# Patient Record
Sex: MALE | Race: WHITE | NOT HISPANIC OR LATINO | Employment: FULL TIME | URBAN - METROPOLITAN AREA
[De-identification: names, ages, dates, MRNs, and addresses within clinical notes are randomized per-mention and may not be internally consistent; named-entity substitution may affect disease eponyms.]

---

## 2017-01-10 ENCOUNTER — OFFICE VISIT - HEALTHEAST (OUTPATIENT)
Dept: INTERNAL MEDICINE | Facility: CLINIC | Age: 32
End: 2017-01-10

## 2017-01-10 DIAGNOSIS — J01.90 ACUTE SINUSITIS, RECURRENCE NOT SPECIFIED, UNSPECIFIED LOCATION: ICD-10-CM

## 2017-01-10 ASSESSMENT — MIFFLIN-ST. JEOR: SCORE: 1955.05

## 2017-02-09 ENCOUNTER — COMMUNICATION - HEALTHEAST (OUTPATIENT)
Dept: FAMILY MEDICINE | Facility: CLINIC | Age: 32
End: 2017-02-09

## 2017-02-09 DIAGNOSIS — E10.9 TYPE 1 DIABETES MELLITUS WITHOUT COMPLICATION (H): ICD-10-CM

## 2017-05-23 ENCOUNTER — COMMUNICATION - HEALTHEAST (OUTPATIENT)
Dept: FAMILY MEDICINE | Facility: CLINIC | Age: 32
End: 2017-05-23

## 2017-05-23 DIAGNOSIS — E10.9 TYPE 1 DIABETES MELLITUS WITHOUT COMPLICATION (H): ICD-10-CM

## 2017-07-28 ENCOUNTER — COMMUNICATION - HEALTHEAST (OUTPATIENT)
Dept: INTERNAL MEDICINE | Facility: CLINIC | Age: 32
End: 2017-07-28

## 2017-07-28 ENCOUNTER — OFFICE VISIT - HEALTHEAST (OUTPATIENT)
Dept: INTERNAL MEDICINE | Facility: CLINIC | Age: 32
End: 2017-07-28

## 2017-07-28 DIAGNOSIS — Z00.00 HEALTH MAINTENANCE EXAMINATION: ICD-10-CM

## 2017-07-28 DIAGNOSIS — E10.9 TYPE 1 DIABETES MELLITUS WITHOUT COMPLICATION (H): ICD-10-CM

## 2017-07-28 DIAGNOSIS — B35.3 TINEA PEDIS OF RIGHT FOOT: ICD-10-CM

## 2017-07-28 LAB
CHOLEST SERPL-MCNC: 173 MG/DL
FASTING STATUS PATIENT QL REPORTED: YES
HBA1C MFR BLD: 7.2 % (ref 3.5–6)
HDLC SERPL-MCNC: 41 MG/DL
LDLC SERPL CALC-MCNC: 118 MG/DL
TRIGL SERPL-MCNC: 70 MG/DL

## 2017-07-28 ASSESSMENT — MIFFLIN-ST. JEOR: SCORE: 2005.62

## 2017-11-27 ENCOUNTER — COMMUNICATION - HEALTHEAST (OUTPATIENT)
Dept: INTERNAL MEDICINE | Facility: CLINIC | Age: 32
End: 2017-11-27

## 2017-11-27 DIAGNOSIS — L97.509 DIABETIC FOOT ULCER (H): ICD-10-CM

## 2017-11-27 DIAGNOSIS — E11.621 DIABETIC FOOT ULCER (H): ICD-10-CM

## 2018-01-09 ENCOUNTER — COMMUNICATION - HEALTHEAST (OUTPATIENT)
Dept: FAMILY MEDICINE | Facility: CLINIC | Age: 33
End: 2018-01-09

## 2018-01-09 DIAGNOSIS — E10.9 TYPE 1 DIABETES MELLITUS WITHOUT COMPLICATION (H): ICD-10-CM

## 2018-01-11 ENCOUNTER — COMMUNICATION - HEALTHEAST (OUTPATIENT)
Dept: INTERNAL MEDICINE | Facility: CLINIC | Age: 33
End: 2018-01-11

## 2018-02-06 ENCOUNTER — OFFICE VISIT - HEALTHEAST (OUTPATIENT)
Dept: INTERNAL MEDICINE | Facility: CLINIC | Age: 33
End: 2018-02-06

## 2018-02-06 DIAGNOSIS — E10.9 TYPE 1 DIABETES MELLITUS WITHOUT COMPLICATION (H): ICD-10-CM

## 2018-02-06 DIAGNOSIS — R21 RASH: ICD-10-CM

## 2018-02-06 DIAGNOSIS — D17.9 LIPOMA: ICD-10-CM

## 2018-02-06 LAB
ANION GAP SERPL CALCULATED.3IONS-SCNC: 13 MMOL/L (ref 5–18)
BUN SERPL-MCNC: 11 MG/DL (ref 8–22)
CALCIUM SERPL-MCNC: 10 MG/DL (ref 8.5–10.5)
CHLORIDE BLD-SCNC: 101 MMOL/L (ref 98–107)
CHOLEST SERPL-MCNC: 192 MG/DL
CO2 SERPL-SCNC: 28 MMOL/L (ref 22–31)
CREAT SERPL-MCNC: 0.94 MG/DL (ref 0.7–1.3)
CREAT UR-MCNC: 360.3 MG/DL
FASTING STATUS PATIENT QL REPORTED: YES
GFR SERPL CREATININE-BSD FRML MDRD: >60 ML/MIN/1.73M2
GLUCOSE BLD-MCNC: 92 MG/DL (ref 70–125)
HBA1C MFR BLD: 6.8 % (ref 3.5–6)
HDLC SERPL-MCNC: 48 MG/DL
LDLC SERPL CALC-MCNC: 126 MG/DL
MICROALBUMIN UR-MCNC: 0.93 MG/DL (ref 0–1.99)
MICROALBUMIN/CREAT UR: 2.6 MG/G
POTASSIUM BLD-SCNC: 4.3 MMOL/L (ref 3.5–5)
SODIUM SERPL-SCNC: 142 MMOL/L (ref 136–145)
TRIGL SERPL-MCNC: 91 MG/DL

## 2018-02-06 ASSESSMENT — MIFFLIN-ST. JEOR: SCORE: 1989.97

## 2018-02-19 ENCOUNTER — OFFICE VISIT - HEALTHEAST (OUTPATIENT)
Dept: SURGERY | Facility: CLINIC | Age: 33
End: 2018-02-19

## 2018-02-19 DIAGNOSIS — R22.9 LUMP OF SKIN: ICD-10-CM

## 2018-02-19 ASSESSMENT — MIFFLIN-ST. JEOR: SCORE: 1988.04

## 2018-03-19 ENCOUNTER — ANESTHESIA - HEALTHEAST (OUTPATIENT)
Dept: SURGERY | Facility: AMBULATORY SURGERY CENTER | Age: 33
End: 2018-03-19

## 2018-03-19 ASSESSMENT — MIFFLIN-ST. JEOR: SCORE: 1981.02

## 2018-03-20 ENCOUNTER — SURGERY - HEALTHEAST (OUTPATIENT)
Dept: SURGERY | Facility: AMBULATORY SURGERY CENTER | Age: 33
End: 2018-03-20

## 2018-06-27 ENCOUNTER — OFFICE VISIT (OUTPATIENT)
Dept: ORTHOPEDICS | Facility: CLINIC | Age: 33
End: 2018-06-27
Payer: COMMERCIAL

## 2018-06-27 VITALS — HEIGHT: 71 IN | DIASTOLIC BLOOD PRESSURE: 69 MMHG | HEART RATE: 68 BPM | SYSTOLIC BLOOD PRESSURE: 118 MMHG

## 2018-06-27 DIAGNOSIS — S86.002A INJURY OF LEFT ACHILLES TENDON, INITIAL ENCOUNTER: Primary | ICD-10-CM

## 2018-06-27 NOTE — MR AVS SNAPSHOT
After Visit Summary   6/27/2018    Faraz Horowitz    MRN: [id number]           Patient Information     Date Of Birth          1985        Visit Information        Provider Department      6/27/2018 9:30 AM Al Gutierrez MD [facility]        Today's Diagnoses     Injury of left Achilles tendon, initial encounter    -  1       Follow-ups after your visit        Your next 10 appointments already scheduled     Jul 03, 2018  6:15 AM CDT   MR ANKLE LEFT W/O CONTRAST with [id number]   [facility] ([facility])    [address] [address] [phone]           Take your medicines as usual, unless your doctor tells you not to. Bring a list of your current medicines to your exam (including vitamins, minerals and over-the-counter drugs). Also bring the results of similar scans you may have had.  Please remove any body piercings and hair extensions before you arrive.  Follow your doctor s orders. If you do not, we may have to postpone your exam.  You may or may not receive IV contrast for this exam pending the discretion of the Radiologist.  You do not need to do anything special to prepare.  The MRI machine uses a strong magnet. Please wear clothes without metal (snaps, zippers). A sweatsuit works well, or we may give you a hospital gown.   **IMPORTANT** THE INSTRUCTIONS BELOW ARE ONLY FOR THOSE PATIENTS WHO HAVE BEEN PRESCRIBED SEDATION OR GENERAL ANESTHESIA DURING THEIR MRI PROCEDURE:  IF YOUR DOCTOR PRESCRIBED ORAL SEDATION (take medicine to help you relax during your exam):   You must get the medicine from your doctor (oral medication) before you arrive. Bring the medicine to the exam. Do not take it at home. You ll be told when to take it upon arriving for your exam.   Arrive one hour early. Bring someone who can take you home after the test. Your medicine will make you sleepy.  After the exam, you may not drive, take a bus or take a taxi by yourself.  IF YOUR DOCTOR PRESCRIBED IV SEDATION:   Arrive one hour early. Bring someone who can take you home after the test. Your medicine will make you sleepy. After the exam, you may not drive, take a bus or take a taxi by yourself.   No eating 6 hours before your exam. You may have clear liquids up until 4 hours before your exam. (Clear liquids include water, clear tea, black coffee and fruit juice without pulp.)  IF YOUR DOCTOR PRESCRIBED ANESTHESIA (be asleep for your exam):   Arrive 1 1/2 hours early. Bring someone who can take you home after the test. You may not drive, take a bus or take a taxi by yourself.   No eating 8 hours before your exam. You may have clear liquids up until 4 hours before your exam. (Clear liquids include water, clear tea, black coffee and fruit juice without pulp.)   You will spend four to five hours in the recovery room.  Please call the Imaging Department at your exam site with any questions.              Future tests that were ordered for you today     Open Future Orders        Priority Expected Expires Ordered    MR Ankle Left w/o Contrast Routine  6/27/2019 6/27/2018            Who to contact     Please call your clinic at [phone] to:    Ask questions about your health    Make or cancel appointments    Discuss your medicines    Learn about your test results    Speak to your doctor            Additional Information About Your Visit        Skyl[employer] Information     [employer] is an electronic gateway that provides easy, online access to your medical records. With DAQRI, you can request a clinic appointment, read your test results, renew a prescription or communicate with your care team.     To sign up for DAQRI visit the website at [email or web address]   You will be asked to enter the access code listed below, as well as some personal information. Please follow the directions to create your username and  "password.     Your access code is: [email or web address]  Expires: 2018 11:01 AM     Your access code will  in 90 days. If you need help or a new code, please contact your [facility] or call [phone] for assistance.        Care EveryWhere ID     This is your Care EveryWhere ID. This could be used by other organizations to access your [facility] medical records  [id number]        Your Vitals Were     Pulse Height                68 5' 10.5\" (1.791 m)           Blood Pressure from Last 3 Encounters:   18 118/69   08 124/62   08 112/72    Weight from Last 3 Encounters:   08 195 lb (88.5 kg)               Primary Care Provider    None Specified       No primary provider on file.        Equal Access to Services     [facility] : Roberto zamudio Soteagan, waaxda luqadaha, qaybta kaalmada isabelle, huber de la torre . So [facility] [phone].    ATENCIÓN: Si habla español, tiene a chavez disposición servicios gratuitos de asistencia lingüística. Wai al [phone].    We comply with applicable federal civil rights laws and [place] laws. We do not discriminate on the basis of race, color, national origin, age, disability, sex, sexual orientation, or gender identity.            Thank you!     Thank you for choosing [facility]  for your care. Our goal is always to provide you with excellent care. Hearing back from our patients is one way we can continue to improve our services. Please take a few minutes to complete the written survey that you may receive in the mail after your visit with us. Thank you!             Your Updated Medication List - Protect others around you: Learn how to safely use, store and throw away your medicines at www.disposemymeds.org.          This list is accurate as of 18 11:01 AM.  Always use your most recent med list.                   Brand Name Dispense Instructions for " use Diagnosis    LANTUS SC      None Entered

## 2018-06-27 NOTE — PROGRESS NOTES
SPORTS & ORTHOPEDIC WALK-IN VISIT 6/27/2018    Primary Care Physician:  11 days ago Inversion SUZANNE from pot hole. Some med/lat tenderness. Pain/Bruising along posterior ankle/achilles tendon  Reason for visit:     What part of your body is injured / painful?  left ankle    What caused the injury /pain? Stepping in a pot hole    How long ago did your injury occur or pain begin? a week ago    What are your most bothersome symptoms? Pain    How would you characterize your symptom?  aching    What makes your symptoms better? Rest, Ice and Ibuprofen    What makes your symptoms worse? Walking    Have you been previously seen for this problem? No    Medical History:    Any recent changes to your medical history? No    Any new medication prescribed since last visit? No    Have you had surgery on this body part before? No    Social History:    Occupation: [employer]    Handedness: Right    Exercise: Cardiovascular    Review of Systems:    Do you have fever, chills, weight loss? No    Do you have any vision problems? No    Do you have any chest pain or edema? No    Do you have any shortness of breath or wheezing?  No    Do you have stomach problems? No    Do you have any numbness or focal weakness? No    Do you have diabetes? Yes, Type 1    Do you have problems with bleeding or clotting? No    Do you have an rashes or other skin lesions? No

## 2018-06-27 NOTE — LETTER
6/27/2018       RE: Faraz Horowitz  [address] [address]     Dear Colleague,    Thank you for referring your patient, Faraz Horowitz, to the [facility]IN[facility] at [facility]. Please see a copy of my visit note below.          SPORTS & ORTHOPEDIC WALK-IN VISIT 6/27/2018    Primary Care Physician:  11 days ago Inversion SUZANNE from pot hole. Some med/lat tenderness. Pain/Bruising along posterior ankle/achilles tendon  Reason for visit:     What part of your body is injured / painful?  left ankle    What caused the injury /pain? Stepping in a pot hole    How long ago did your injury occur or pain begin? a week ago    What are your most bothersome symptoms? Pain    How would you characterize your symptom?  aching    What makes your symptoms better? Rest, Ice and Ibuprofen    What makes your symptoms worse? Walking    Have you been previously seen for this problem? No    Medical History:    Any recent changes to your medical history? No    Any new medication prescribed since last visit? No    Have you had surgery on this body part before? No    Social History:    Occupation: [employer]    Handedness: Right    Exercise: Cardiovascular    Review of Systems:    Do you have fever, chills, weight loss? No    Do you have any vision problems? No    Do you have any chest pain or edema? No    Do you have any shortness of breath or wheezing?  No    Do you have stomach problems? No    Do you have any numbness or focal weakness? No    Do you have diabetes? Yes, Type 1    Do you have problems with bleeding or clotting? No    Do you have an rashes or other skin lesions? No           [facility] Note      Patient is a 32 year old male who presents to the office today for: left ankle injury.  11 days was walking backwards and stepped in pothole suffering a ankle injury.  He suspects inversion type mechanism.  Had  "pain in the medial ankle immediately after the injury however in the days since has had increasing pain in the posterior calf and heel.  Has had some swelling and ecchymosis in the ankle and heel.  No prior history of ankle injury or heel pain.  Has pain with walking.  No pain at rest.  No tingling or numbness.    Denies weakness, numbness, tingling, clicking, locking, or catching.      Past Medical History, Current Medications, and Allergies are reviewed in the electronic medical record as appropriate.     ROS: Pertinent items are noted in HPI.  Constitutional: negative for fevers, chills and malaise  Cardiovascular: negative for dyspnea, fatigue,   Integument/breast: negative for rash, skin lesion(s)   Neurological: negative for paresthesia and weakness      EXAM:Ht 5' 10.5\" (1.791 m)    General: Alert, pleasant, no distress  Left ankle: Moderate soft tissue swelling throughout the ankle with ecchymosis inferior to the medial and lateral malleoli.  No deformity.  Walks with antalgic gait.  Pain with passive dorsiflexion and plantarflexion against resistance.  Plantarflexion 4/5 compared to contralateral side.  Otherwise no pain with range of motion or strength testing.  There is TTP of the medial lateral malleoli as well as the midportion of the Achilles tendon extending proximally.  There is less Achilles bulk to palpation in comparison to contralateral side.     Imaging: MRI pending       Assessment: Patient is a 32 year old male with left ankle injury for the last 10 days.  Given the localization of the current symptoms as well as exam findings have concern for substantial though not complete Achilles tendon rupture.    Recommendations:   Reviewed assessment with the patient in detail.  Recommended walking boot with heel lift for comfort and support.  Follow-up for MRI.  Will discuss next steps after results available.  Okay to continue elevating and icing for swelling.  NSAIDs or acetaminophen for " pain.    Al Gutierrez MD

## 2018-06-27 NOTE — PROGRESS NOTES
"[facility] Note      Patient is a 32 year old male who presents to the office today for: left ankle injury.  11 days was walking backwards and stepped in pothole suffering a ankle injury.  He suspects inversion type mechanism.  Had pain in the medial ankle immediately after the injury however in the days since has had increasing pain in the posterior calf and heel.  Has had some swelling and ecchymosis in the ankle and heel.  No prior history of ankle injury or heel pain.  Has pain with walking.  No pain at rest.  No tingling or numbness.    Denies weakness, numbness, tingling, clicking, locking, or catching.      Past Medical History, Current Medications, and Allergies are reviewed in the electronic medical record as appropriate.     ROS: Pertinent items are noted in HPI.  Constitutional: negative for fevers, chills and malaise  Cardiovascular: negative for dyspnea, fatigue,   Integument/breast: negative for rash, skin lesion(s)   Neurological: negative for paresthesia and weakness      EXAM:Ht 5' 10.5\" (1.791 m)    General: Alert, pleasant, no distress  Left ankle: Moderate soft tissue swelling throughout the ankle with ecchymosis inferior to the medial and lateral malleoli.  No deformity.  Walks with antalgic gait.  Pain with passive dorsiflexion and plantarflexion against resistance.  Plantarflexion 4/5 compared to contralateral side.  Otherwise no pain with range of motion or strength testing.  There is TTP of the medial lateral malleoli as well as the midportion of the Achilles tendon extending proximally.  There is less Achilles bulk to palpation in comparison to contralateral side.     Imaging: MRI pending       Assessment: Patient is a 32 year old male with left ankle injury for the last 10 days.  Given the localization of the current symptoms as well as exam findings have concern for substantial though not complete Achilles tendon rupture.    Recommendations:   Reviewed " assessment with the patient in detail.  Recommended walking boot with heel lift for comfort and support.  Follow-up for MRI.  Will discuss next steps after results available.  Okay to continue elevating and icing for swelling.  NSAIDs or acetaminophen for pain.    Al Gutierrez MD

## 2018-07-03 ENCOUNTER — RADIANT APPOINTMENT (OUTPATIENT)
Dept: MRI IMAGING | Facility: CLINIC | Age: 33
End: 2018-07-03
Attending: FAMILY MEDICINE
Payer: COMMERCIAL

## 2018-07-03 DIAGNOSIS — S86.002A INJURY OF LEFT ACHILLES TENDON, INITIAL ENCOUNTER: ICD-10-CM

## 2018-07-05 ENCOUNTER — TELEPHONE (OUTPATIENT)
Dept: ORTHOPEDICS | Facility: CLINIC | Age: 33
End: 2018-07-05

## 2018-07-05 NOTE — TELEPHONE ENCOUNTER
Patient called requesting results from MRI done 7/3. He is going out of town next Monday and would like to have time to  anything he needs before he leaves. I told him that Dr. Gutierrez is not in clinic today, but he will be back tomorrow. Sometimes he responds to messages from home but I can't guarantee it. Dr. Gutierrez should be able to get back to him by tomorrow though. He verbalized understanding. Message was routed to Dr. Gutierrez.

## 2018-07-06 ENCOUNTER — TELEPHONE (OUTPATIENT)
Dept: ORTHOPEDICS | Facility: CLINIC | Age: 33
End: 2018-07-06

## 2018-07-06 NOTE — TELEPHONE ENCOUNTER
[facility] Call Center    Phone Message    May a detailed message be left on voicemail: yes    Reason for Call: Other: Pt would like to speak to Dr. Gutierrez about what he should do with the ankle injury bc he's going out of town.      Action Taken: Message routed to:  [facility] ([facility]): Karyn

## 2018-07-10 NOTE — TELEPHONE ENCOUNTER
Discussed results with patient over the phone on 7/6. Recommended follow up with Dr. Barnes of foot and ankle surgery. Contact info given. Recommended continue in boot with heel lift and crutches until follow up.

## 2018-12-08 ENCOUNTER — COMMUNICATION - HEALTHEAST (OUTPATIENT)
Dept: FAMILY MEDICINE | Facility: CLINIC | Age: 33
End: 2018-12-08

## 2018-12-08 DIAGNOSIS — E10.9 TYPE 1 DIABETES MELLITUS WITHOUT COMPLICATION (H): ICD-10-CM

## 2018-12-11 ENCOUNTER — OFFICE VISIT - HEALTHEAST (OUTPATIENT)
Dept: INTERNAL MEDICINE | Facility: CLINIC | Age: 33
End: 2018-12-11

## 2018-12-11 DIAGNOSIS — E10.9 TYPE 1 DIABETES MELLITUS WITHOUT COMPLICATION (H): ICD-10-CM

## 2018-12-11 DIAGNOSIS — Z00.00 HEALTH MAINTENANCE EXAMINATION: ICD-10-CM

## 2018-12-11 LAB
ALBUMIN SERPL-MCNC: 4.2 G/DL (ref 3.5–5)
ALP SERPL-CCNC: 58 U/L (ref 45–120)
ALT SERPL W P-5'-P-CCNC: 19 U/L (ref 0–45)
ANION GAP SERPL CALCULATED.3IONS-SCNC: 11 MMOL/L (ref 5–18)
AST SERPL W P-5'-P-CCNC: 18 U/L (ref 0–40)
BILIRUB DIRECT SERPL-MCNC: 0.3 MG/DL
BILIRUB SERPL-MCNC: 1.1 MG/DL (ref 0–1)
BUN SERPL-MCNC: 12 MG/DL (ref 8–22)
CALCIUM SERPL-MCNC: 9.7 MG/DL (ref 8.5–10.5)
CHLORIDE BLD-SCNC: 103 MMOL/L (ref 98–107)
CHOLEST SERPL-MCNC: 174 MG/DL
CO2 SERPL-SCNC: 28 MMOL/L (ref 22–31)
CREAT SERPL-MCNC: 0.93 MG/DL (ref 0.7–1.3)
CREAT UR-MCNC: 183.8 MG/DL
FASTING STATUS PATIENT QL REPORTED: YES
GFR SERPL CREATININE-BSD FRML MDRD: >60 ML/MIN/1.73M2
GLUCOSE BLD-MCNC: 75 MG/DL (ref 70–125)
HBA1C MFR BLD: 6.5 % (ref 3.5–6)
HDLC SERPL-MCNC: 50 MG/DL
LDLC SERPL CALC-MCNC: 109 MG/DL
MICROALBUMIN UR-MCNC: <0.5 MG/DL (ref 0–1.99)
MICROALBUMIN/CREAT UR: NORMAL MG/G
POTASSIUM BLD-SCNC: 4 MMOL/L (ref 3.5–5)
PROT SERPL-MCNC: 7 G/DL (ref 6–8)
SODIUM SERPL-SCNC: 142 MMOL/L (ref 136–145)
TRIGL SERPL-MCNC: 75 MG/DL

## 2018-12-11 ASSESSMENT — MIFFLIN-ST. JEOR: SCORE: 1992.41

## 2019-01-10 ENCOUNTER — COMMUNICATION - HEALTHEAST (OUTPATIENT)
Dept: INTERNAL MEDICINE | Facility: CLINIC | Age: 34
End: 2019-01-10

## 2019-01-10 DIAGNOSIS — E10.9 TYPE 1 DIABETES MELLITUS WITHOUT COMPLICATION (H): ICD-10-CM

## 2019-01-14 ENCOUNTER — COMMUNICATION - HEALTHEAST (OUTPATIENT)
Dept: INTERNAL MEDICINE | Facility: CLINIC | Age: 34
End: 2019-01-14

## 2019-01-14 DIAGNOSIS — E10.9 TYPE 1 DIABETES MELLITUS WITHOUT COMPLICATION (H): ICD-10-CM

## 2019-01-15 ENCOUNTER — COMMUNICATION - HEALTHEAST (OUTPATIENT)
Dept: INTERNAL MEDICINE | Facility: CLINIC | Age: 34
End: 2019-01-15

## 2019-04-04 ENCOUNTER — COMMUNICATION - HEALTHEAST (OUTPATIENT)
Dept: INTERNAL MEDICINE | Facility: CLINIC | Age: 34
End: 2019-04-04

## 2019-04-04 DIAGNOSIS — E10.9 TYPE 1 DIABETES MELLITUS WITHOUT COMPLICATION (H): ICD-10-CM

## 2019-04-19 ENCOUNTER — COMMUNICATION - HEALTHEAST (OUTPATIENT)
Dept: INTERNAL MEDICINE | Facility: CLINIC | Age: 34
End: 2019-04-19

## 2019-04-19 DIAGNOSIS — E10.9 TYPE 1 DIABETES MELLITUS WITHOUT COMPLICATION (H): ICD-10-CM

## 2019-07-02 ENCOUNTER — COMMUNICATION - HEALTHEAST (OUTPATIENT)
Dept: INTERNAL MEDICINE | Facility: CLINIC | Age: 34
End: 2019-07-02

## 2019-08-29 ENCOUNTER — COMMUNICATION - HEALTHEAST (OUTPATIENT)
Dept: INTERNAL MEDICINE | Facility: CLINIC | Age: 34
End: 2019-08-29

## 2019-08-29 DIAGNOSIS — E10.9 TYPE 1 DIABETES MELLITUS WITHOUT COMPLICATION (H): ICD-10-CM

## 2019-09-12 ENCOUNTER — OFFICE VISIT - HEALTHEAST (OUTPATIENT)
Dept: INTERNAL MEDICINE | Facility: CLINIC | Age: 34
End: 2019-09-12

## 2019-09-12 DIAGNOSIS — E10.9 TYPE 1 DIABETES MELLITUS (H): ICD-10-CM

## 2019-09-12 DIAGNOSIS — B35.3 TINEA PEDIS OF BOTH FEET: ICD-10-CM

## 2019-09-12 LAB — HBA1C MFR BLD: 6.7 % (ref 3.5–6)

## 2019-10-02 ENCOUNTER — HEALTH MAINTENANCE LETTER (OUTPATIENT)
Age: 34
End: 2019-10-02

## 2019-10-14 ENCOUNTER — RECORDS - HEALTHEAST (OUTPATIENT)
Dept: ADMINISTRATIVE | Facility: OTHER | Age: 34
End: 2019-10-14

## 2019-10-23 ENCOUNTER — RECORDS - HEALTHEAST (OUTPATIENT)
Dept: HEALTH INFORMATION MANAGEMENT | Facility: CLINIC | Age: 34
End: 2019-10-23

## 2019-12-05 ENCOUNTER — COMMUNICATION - HEALTHEAST (OUTPATIENT)
Dept: INTERNAL MEDICINE | Facility: CLINIC | Age: 34
End: 2019-12-05

## 2019-12-05 DIAGNOSIS — B35.3 TINEA PEDIS OF BOTH FEET: ICD-10-CM

## 2019-12-16 ENCOUNTER — COMMUNICATION - HEALTHEAST (OUTPATIENT)
Dept: INTERNAL MEDICINE | Facility: CLINIC | Age: 34
End: 2019-12-16

## 2019-12-16 DIAGNOSIS — E10.9 TYPE 1 DIABETES MELLITUS (H): ICD-10-CM

## 2019-12-19 ENCOUNTER — COMMUNICATION - HEALTHEAST (OUTPATIENT)
Dept: LAB | Facility: CLINIC | Age: 34
End: 2019-12-19

## 2019-12-19 DIAGNOSIS — Z00.00 HEALTH MAINTENANCE EXAMINATION: ICD-10-CM

## 2019-12-19 DIAGNOSIS — E10.9 TYPE 1 DIABETES MELLITUS WITHOUT COMPLICATION (H): ICD-10-CM

## 2019-12-19 DIAGNOSIS — E55.9 VITAMIN D DEFICIENCY DISEASE: ICD-10-CM

## 2020-01-30 ENCOUNTER — AMBULATORY - HEALTHEAST (OUTPATIENT)
Dept: LAB | Facility: CLINIC | Age: 35
End: 2020-01-30

## 2020-01-30 DIAGNOSIS — E10.9 TYPE 1 DIABETES MELLITUS (H): ICD-10-CM

## 2020-01-30 DIAGNOSIS — E10.9 TYPE 1 DIABETES MELLITUS WITHOUT COMPLICATION (H): ICD-10-CM

## 2020-01-30 DIAGNOSIS — Z00.00 HEALTH MAINTENANCE EXAMINATION: ICD-10-CM

## 2020-01-30 LAB
ALBUMIN SERPL-MCNC: 4.3 G/DL (ref 3.5–5)
ALP SERPL-CCNC: 60 U/L (ref 45–120)
ALT SERPL W P-5'-P-CCNC: 15 U/L (ref 0–45)
ANION GAP SERPL CALCULATED.3IONS-SCNC: 9 MMOL/L (ref 5–18)
AST SERPL W P-5'-P-CCNC: 16 U/L (ref 0–40)
BILIRUB DIRECT SERPL-MCNC: 0.2 MG/DL
BILIRUB SERPL-MCNC: 0.6 MG/DL (ref 0–1)
BUN SERPL-MCNC: 12 MG/DL (ref 8–22)
CALCIUM SERPL-MCNC: 9.9 MG/DL (ref 8.5–10.5)
CHLORIDE BLD-SCNC: 105 MMOL/L (ref 98–107)
CHOLEST SERPL-MCNC: 174 MG/DL
CO2 SERPL-SCNC: 30 MMOL/L (ref 22–31)
CREAT SERPL-MCNC: 1.02 MG/DL (ref 0.7–1.3)
CREAT UR-MCNC: 152.5 MG/DL
ERYTHROCYTE [DISTWIDTH] IN BLOOD BY AUTOMATED COUNT: 11.9 % (ref 11–14.5)
FASTING STATUS PATIENT QL REPORTED: YES
GFR SERPL CREATININE-BSD FRML MDRD: >60 ML/MIN/1.73M2
GLUCOSE BLD-MCNC: 68 MG/DL (ref 70–125)
HBA1C MFR BLD: 7.1 % (ref 3.5–6)
HCT VFR BLD AUTO: 50.3 % (ref 40–54)
HDLC SERPL-MCNC: 51 MG/DL
HGB BLD-MCNC: 17.2 G/DL (ref 14–18)
LDLC SERPL CALC-MCNC: 105 MG/DL
MCH RBC QN AUTO: 30.8 PG (ref 27–34)
MCHC RBC AUTO-ENTMCNC: 34.2 G/DL (ref 32–36)
MCV RBC AUTO: 90 FL (ref 80–100)
MICROALBUMIN UR-MCNC: <0.5 MG/DL (ref 0–1.99)
MICROALBUMIN/CREAT UR: NORMAL MG/G{CREAT}
PLATELET # BLD AUTO: 298 THOU/UL (ref 140–440)
PMV BLD AUTO: 10.9 FL (ref 8.5–12.5)
POTASSIUM BLD-SCNC: 4.7 MMOL/L (ref 3.5–5)
PROT SERPL-MCNC: 7 G/DL (ref 6–8)
RBC # BLD AUTO: 5.59 MILL/UL (ref 4.4–6.2)
SODIUM SERPL-SCNC: 144 MMOL/L (ref 136–145)
TRIGL SERPL-MCNC: 92 MG/DL
WBC: 5.9 THOU/UL (ref 4–11)

## 2020-02-04 ENCOUNTER — OFFICE VISIT - HEALTHEAST (OUTPATIENT)
Dept: INTERNAL MEDICINE | Facility: CLINIC | Age: 35
End: 2020-02-04

## 2020-02-04 ENCOUNTER — COMMUNICATION - HEALTHEAST (OUTPATIENT)
Dept: INTERNAL MEDICINE | Facility: CLINIC | Age: 35
End: 2020-02-04

## 2020-02-04 DIAGNOSIS — Z00.00 HEALTH MAINTENANCE EXAMINATION: ICD-10-CM

## 2020-02-04 DIAGNOSIS — E10.9 TYPE 1 DIABETES MELLITUS WITHOUT COMPLICATION (H): ICD-10-CM

## 2020-02-04 ASSESSMENT — MIFFLIN-ST. JEOR: SCORE: 2019.85

## 2020-03-09 ENCOUNTER — COMMUNICATION - HEALTHEAST (OUTPATIENT)
Dept: INTERNAL MEDICINE | Facility: CLINIC | Age: 35
End: 2020-03-09

## 2020-03-09 DIAGNOSIS — E10.9 TYPE 1 DIABETES MELLITUS (H): ICD-10-CM

## 2020-03-11 ENCOUNTER — COMMUNICATION - HEALTHEAST (OUTPATIENT)
Dept: INTERNAL MEDICINE | Facility: CLINIC | Age: 35
End: 2020-03-11

## 2020-03-11 DIAGNOSIS — E10.9 TYPE 1 DIABETES MELLITUS (H): ICD-10-CM

## 2020-06-28 ENCOUNTER — COMMUNICATION - HEALTHEAST (OUTPATIENT)
Dept: INTERNAL MEDICINE | Facility: CLINIC | Age: 35
End: 2020-06-28

## 2020-06-28 DIAGNOSIS — B35.3 TINEA PEDIS OF BOTH FEET: ICD-10-CM

## 2020-07-13 ENCOUNTER — MYC MEDICAL ADVICE (OUTPATIENT)
Dept: ENDOCRINOLOGY | Facility: CLINIC | Age: 35
End: 2020-07-13

## 2020-07-13 ENCOUNTER — VIRTUAL VISIT (OUTPATIENT)
Dept: ENDOCRINOLOGY | Facility: CLINIC | Age: 35
End: 2020-07-13
Payer: COMMERCIAL

## 2020-07-13 DIAGNOSIS — E10.9 TYPE 1 DIABETES MELLITUS WITHOUT COMPLICATION (H): Primary | ICD-10-CM

## 2020-07-13 PROCEDURE — 99203 OFFICE O/P NEW LOW 30 MIN: CPT | Mod: 95 | Performed by: INTERNAL MEDICINE

## 2020-07-13 RX ORDER — PROCHLORPERAZINE 25 MG/1
1 SUPPOSITORY RECTAL DAILY
Qty: 1 DEVICE | Refills: 0 | Status: SHIPPED | OUTPATIENT
Start: 2020-07-13 | End: 2021-10-11

## 2020-07-13 RX ORDER — BLOOD SUGAR DIAGNOSTIC
1 STRIP MISCELLANEOUS
COMMUNITY
Start: 2019-01-14 | End: 2023-04-05

## 2020-07-13 RX ORDER — PROCHLORPERAZINE 25 MG/1
1 SUPPOSITORY RECTAL
Qty: 2 EACH | Refills: 1 | Status: SHIPPED | OUTPATIENT
Start: 2020-07-13 | End: 2021-01-27

## 2020-07-13 RX ORDER — FLUCONAZOLE 200 MG/1
200 TABLET ORAL
COMMUNITY
Start: 2020-06-29 | End: 2021-01-15

## 2020-07-13 RX ORDER — PROCHLORPERAZINE 25 MG/1
1 SUPPOSITORY RECTAL
Qty: 3 EACH | Refills: 11 | Status: SHIPPED | OUTPATIENT
Start: 2020-07-13 | End: 2021-01-27
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
## 2020-07-23 DIAGNOSIS — E10.9 TYPE 1 DIABETES MELLITUS WITHOUT COMPLICATION (H): ICD-10-CM

## 2020-07-23 LAB
HBA1C MFR BLD: 7.8 % (ref 0–5.6)
TSH SERPL DL<=0.005 MIU/L-ACNC: 2.33 MU/L (ref 0.4–4)

## 2020-07-23 PROCEDURE — 83036 HEMOGLOBIN GLYCOSYLATED A1C: CPT | Performed by: INTERNAL MEDICINE

## 2020-07-23 PROCEDURE — 36415 COLL VENOUS BLD VENIPUNCTURE: CPT | Performed by: INTERNAL MEDICINE

## 2020-07-23 PROCEDURE — 84443 ASSAY THYROID STIM HORMONE: CPT | Performed by: INTERNAL MEDICINE

## 2020-08-10 ENCOUNTER — MYC MEDICAL ADVICE (OUTPATIENT)
Dept: ENDOCRINOLOGY | Facility: CLINIC | Age: 35
End: 2020-08-10

## 2020-08-11 ENCOUNTER — MYC MEDICAL ADVICE (OUTPATIENT)
Dept: ENDOCRINOLOGY | Facility: CLINIC | Age: 35
End: 2020-08-11
Payer: COMMERCIAL

## 2020-08-11 DIAGNOSIS — E10.9 TYPE 1 DIABETES MELLITUS WITHOUT COMPLICATION (H): Primary | ICD-10-CM

## 2020-08-11 NOTE — TELEPHONE ENCOUNTER
Called patient and informed that we had to print a new DMV form. Dr. Delcid has completed, but need patient to complete his section of the form. Informed that he could come to [place] to sign or I could fax it or email it to him.  Patient requested that the form be faxed to [phone] and emailed to him as well.     Form faxed to number provided and emailed to patient.     Genevieve MALDONADO MA

## 2020-09-13 ENCOUNTER — COMMUNICATION - HEALTHEAST (OUTPATIENT)
Dept: INTERNAL MEDICINE | Facility: CLINIC | Age: 35
End: 2020-09-13

## 2020-09-13 DIAGNOSIS — E10.9 TYPE 1 DIABETES MELLITUS (H): ICD-10-CM

## 2021-01-05 ENCOUNTER — MYC MEDICAL ADVICE (OUTPATIENT)
Dept: ENDOCRINOLOGY | Facility: CLINIC | Age: 36
End: 2021-01-05

## 2021-01-05 DIAGNOSIS — E10.9 TYPE 1 DIABETES MELLITUS WITHOUT COMPLICATION (H): ICD-10-CM

## 2021-01-05 LAB
ALT SERPL W P-5'-P-CCNC: 26 U/L (ref 0–70)
ANION GAP SERPL CALCULATED.3IONS-SCNC: 3 MMOL/L (ref 3–14)
AST SERPL W P-5'-P-CCNC: 19 U/L (ref 0–45)
BUN SERPL-MCNC: 16 MG/DL (ref 7–30)
CALCIUM SERPL-MCNC: 9.3 MG/DL (ref 8.5–10.1)
CHLORIDE SERPL-SCNC: 103 MMOL/L (ref 94–109)
CHOLEST SERPL-MCNC: 188 MG/DL
CO2 SERPL-SCNC: 30 MMOL/L (ref 20–32)
CREAT SERPL-MCNC: 1.09 MG/DL (ref 0.66–1.25)
CREAT UR-MCNC: 240 MG/DL
GFR SERPL CREATININE-BSD FRML MDRD: 88 ML/MIN/{1.73_M2}
GLUCOSE SERPL-MCNC: 124 MG/DL (ref 70–99)
HBA1C MFR BLD: 6.2 % (ref 0–5.6)
HDLC SERPL-MCNC: 70 MG/DL
LDLC SERPL CALC-MCNC: 105 MG/DL
MICROALBUMIN UR-MCNC: 6 MG/L
MICROALBUMIN/CREAT UR: 2.68 MG/G CR (ref 0–17)
NONHDLC SERPL-MCNC: 118 MG/DL
POTASSIUM SERPL-SCNC: 4.4 MMOL/L (ref 3.4–5.3)
SODIUM SERPL-SCNC: 136 MMOL/L (ref 133–144)
TRIGL SERPL-MCNC: 66 MG/DL
TSH SERPL DL<=0.005 MIU/L-ACNC: 1.66 MU/L (ref 0.4–4)

## 2021-01-05 PROCEDURE — 80048 BASIC METABOLIC PNL TOTAL CA: CPT | Performed by: INTERNAL MEDICINE

## 2021-01-05 PROCEDURE — 82043 UR ALBUMIN QUANTITATIVE: CPT | Performed by: INTERNAL MEDICINE

## 2021-01-05 PROCEDURE — 83036 HEMOGLOBIN GLYCOSYLATED A1C: CPT | Performed by: INTERNAL MEDICINE

## 2021-01-05 PROCEDURE — 80061 LIPID PANEL: CPT | Performed by: INTERNAL MEDICINE

## 2021-01-05 PROCEDURE — 84450 TRANSFERASE (AST) (SGOT): CPT | Performed by: INTERNAL MEDICINE

## 2021-01-05 PROCEDURE — 84460 ALANINE AMINO (ALT) (SGPT): CPT | Performed by: INTERNAL MEDICINE

## 2021-01-05 PROCEDURE — 84443 ASSAY THYROID STIM HORMONE: CPT | Performed by: INTERNAL MEDICINE

## 2021-01-05 PROCEDURE — 36415 COLL VENOUS BLD VENIPUNCTURE: CPT | Performed by: INTERNAL MEDICINE

## 2021-01-07 NOTE — TELEPHONE ENCOUNTER
Patient called RN's line and was scheduled a follow up phone visit with Dr. Delcid on 1/13/21.    Stpean Irby RN....1/7/2021 10:03 AM

## 2021-01-11 ENCOUNTER — MYC MEDICAL ADVICE (OUTPATIENT)
Dept: ENDOCRINOLOGY | Facility: CLINIC | Age: 36
End: 2021-01-11

## 2021-01-11 DIAGNOSIS — B36.9 FUNGAL SKIN INFECTION: Primary | ICD-10-CM
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
## 2021-01-13 ENCOUNTER — VIRTUAL VISIT (OUTPATIENT)
Dept: ENDOCRINOLOGY | Facility: CLINIC | Age: 36
End: 2021-01-13
Payer: COMMERCIAL

## 2021-01-13 DIAGNOSIS — E10.9 TYPE 1 DIABETES MELLITUS WITHOUT COMPLICATION (H): Primary | ICD-10-CM

## 2021-01-13 PROCEDURE — 99214 OFFICE O/P EST MOD 30 MIN: CPT | Mod: 95 | Performed by: INTERNAL MEDICINE

## 2021-01-13 PROCEDURE — 95251 CONT GLUC MNTR ANALYSIS I&R: CPT | Performed by: INTERNAL MEDICINE

## 2021-01-13 RX ORDER — INSULIN LISPRO 100 [IU]/ML
INJECTION, SOLUTION INTRAVENOUS; SUBCUTANEOUS
Qty: 15 ML | Refills: 11 | Status: SHIPPED | OUTPATIENT
Start: 2021-01-13 | End: 2021-10-11
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
## 2021-01-15 ENCOUNTER — HEALTH MAINTENANCE LETTER (OUTPATIENT)
Age: 36
End: 2021-01-15

## 2021-01-15 RX ORDER — FLUCONAZOLE 200 MG/1
200 TABLET ORAL DAILY
Qty: 30 TABLET | Refills: 2 | Status: SHIPPED | OUTPATIENT
Start: 2021-01-15 | End: 2021-10-11

## 2021-01-27 ENCOUNTER — COMMUNICATION - HEALTHEAST (OUTPATIENT)
Dept: INTERNAL MEDICINE | Facility: CLINIC | Age: 36
End: 2021-01-27

## 2021-01-27 DIAGNOSIS — E10.9 TYPE 1 DIABETES MELLITUS (H): ICD-10-CM

## 2021-05-16 ENCOUNTER — HEALTH MAINTENANCE LETTER (OUTPATIENT)
Age: 36
End: 2021-05-16

## 2021-05-27 NOTE — TELEPHONE ENCOUNTER
Refill Approved    Rx renewed per Medication Renewal Policy. Medication was last renewed on 1/16/19.    Xi Robert, [facility] Triage/Med Refill 4/5/2019     Requested Prescriptions   Pending Prescriptions Disp Refills     HUMALOG KWIKPEN INSULIN 100 unit/mL pen [Pharmacy Med Name: HUMALOG 100 UNITS/ML KWIKPEN]  0     Sig: INJECT 6 UNITS UNDER THE SKIN 3 (THREE) TIMES A DAY BEFORE MEALS.    Insulin/GLP-1 Refill Protocol Passed - 4/4/2019  7:35 AM       Passed - Visit with PCP or prescribing provider visit in last 6 months    Last office visit with prescriber/PCP: Visit date not found OR same dept: Visit date not found OR same specialty: 2/6/2018 Hal Grimm DO Last physical: 12/11/2018 Last MTM visit: Visit date not found     Next appt within 3 mo: Visit date not found  Next physical within 3 mo: Visit date not found  Prescriber OR PCP: Hal Grimm DO  Last diagnosis associated with med order: There are no diagnoses linked to this encounter.  If protocol passes may refill for 6 months if within 3 months of last provider visit (or a total of 9 months).             Passed - A1C in last 6 months    Hemoglobin A1c   Date Value Ref Range Status   12/11/2018 6.5 (H) 3.5 - 6.0 % Final              Passed - Microalbumin in last year    Microalbumin, Random Urine   Date Value Ref Range Status   12/11/2018 <0.50 0.00 - 1.99 mg/dL Final                 Passed - Blood pressure in last year    BP Readings from Last 1 Encounters:   12/11/18 120/84            Passed - Creatinine done in last year    Creatinine   Date Value Ref Range Status   12/11/2018 0.93 0.70 - 1.30 mg/dL Final

## 2021-05-28 NOTE — TELEPHONE ENCOUNTER
Spoke with pt to inquire about diabetic eye exam.  Pt states he hasn't had one within the past year.  Pt would like a referral.

## 2021-05-30 ENCOUNTER — RECORDS - HEALTHEAST (OUTPATIENT)
Dept: ADMINISTRATIVE | Facility: CLINIC | Age: 36
End: 2021-05-30

## 2021-05-30 VITALS — WEIGHT: 218.9 LBS | BODY MASS INDEX: 30.65 KG/M2 | HEIGHT: 71 IN

## 2021-05-31 VITALS — WEIGHT: 228.3 LBS | BODY MASS INDEX: 30.92 KG/M2 | HEIGHT: 72 IN

## 2021-05-31 NOTE — TELEPHONE ENCOUNTER
Patient Returning Call  Reason for call:  Returning Call  Information relayed to patient:  Patient informed below message on need for diabetic eye exam  Patient has additional questions:  Yes  If YES, what are your questions/concerns:  Patient states that he has not had an eye exam in over 2-3 years. He states that his insurance plan did not cover it in the past. Patient does have new insurance now and would like a referral started.    Patient Current Insurance Information  Plan: [facility]  Patient ID: [id number]  Group Number: [id number]  Phone Number: [phone]- provider line    Okay to leave a detailed message?: Yes

## 2021-06-01 VITALS — WEIGHT: 225.3 LBS | BODY MASS INDEX: 31.54 KG/M2 | HEIGHT: 71 IN

## 2021-06-01 VITALS — BODY MASS INDEX: 30.07 KG/M2 | HEIGHT: 72 IN | WEIGHT: 222 LBS

## 2021-06-01 VITALS — HEIGHT: 71 IN | WEIGHT: 225 LBS | BODY MASS INDEX: 31.5 KG/M2

## 2021-06-01 NOTE — PROGRESS NOTES
[facility] Clinic Note    Faraz Horowitz   33 y.o. male    Date of Visit: 9/12/2019  Chief Complaint   Patient presents with     Follow-up     Diabetes       ASSESSMENT/PLAN  1. Type 1 diabetes mellitus (H)  Glycosylated Hemoglobin A1c    JIC LAV    JIC RED    insulin lispro (HUMALOG KWIKPEN INSULIN) 100 unit/mL pen    insulin glargine (LANTUS SOLOSTAR PEN) 100 unit/mL (3 mL) pen   2. Tinea pedis of both feet  fluconazole (DIFLUCAN) 200 MG tablet    miconazole (MICOTIN) 2 % powder     ---------------------------------------------    1. Type 1 Diabetes: Patient is well controlled overall with stable A1C. Advised the patient to continue to monitor his blood sugars and adjust insulin dosing according to activity levels.     2. Tinea Pedis: can continue with the fluconazole as needed and recommend trial of topical micanazole as needed for management     Return in about 6 months (around 3/12/2020) for Recheck, Diabetes.      SUBJECTIVE    Faraz Horowitz 34 yo male following for his Type I Diabetes. He does not have any specific concerns at this time. He has an appointment for a diabetic eye exam in October at [facility].   He is using humolog and lantus insulin.   He states that at times it is challenging to compensate for his blood sugars when he alternates between days of high activity and less activity. He tries to remain activity whether he is doing work on his home or going to the gym. When he knows he is going to have a high activity day he will adjust his basal insulin accordingly.   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     He is also taking Fluconazole 1/week for fungal infection on his feet.     ROS:   Per HPI, all other systems negative     Medications, allergies, and problem list were reviewed and updated    Patient Active Problem List   Diagnosis     Type 1 diabetes mellitus without complication (H)     Vitamin D deficiency disease     Multiple lipomas     Past  Medical History:   Diagnosis Date     Achilles rupture, left      Acquired deflected nasal septum      Diabetes mellitus (H)      Hypertrophy of both inferior nasal turbinates      Current Outpatient Medications   Medication Sig Dispense Refill     ACCU-CHEK ROSANGELA PLUS METER Misc        blood glucose test (ONETOUCH ULTRA BLUE TEST STRIP) strips Use 1 each As Directed 4 (four) times a day. Dispense brand per patient's insurance at pharmacy discretion. 400 strip 3     fluconazole (DIFLUCAN) 200 MG tablet Take 1 tablet (200 mg total) by mouth once a week. 4 tablet 0     insulin glargine (LANTUS SOLOSTAR PEN) 100 unit/mL (3 mL) pen Inject 50-55 Units under the skin at bedtime. 15 adj dose pen 3     insulin lispro (HUMALOG KWIKPEN INSULIN) 100 unit/mL pen Inject 6 Units under the skin 3 (three) times a day before meals. 45 adj dose pen 3     miconazole (MICOTIN) 2 % powder Apply topically 2 (two) times a day as needed (athlete's foot).  0     No current facility-administered medications for this visit.      Allergies   Allergen Reactions     Augmentin [Amoxicillin-Pot Clavulanate] Nausea And Vomiting     Doxycycline Other (See Comments)     Elevated blood sugar       EXAM  Vitals:    09/12/19 0922   BP: 110/68   Patient Site: Left Arm   Patient Position: Sitting   Cuff Size: Adult Large   Pulse: (!) 40   SpO2: 98%   Weight: (!) 232 lb 9 oz (105.5 kg)     General: Alert, oriented, no acute distress   HENT: tympanic membranes clear, extra occular muscles intact, moist oral mucosa    Heart: RRR, no murmurs   Lungs: CTA, no increase work of breathing  Monofilament exam: WNL   Vasculature: peripheral pulses intact   Skin: intact, pink, dry     Results reviewed:   Lab Results   Component Value Date    HGBA1C 6.7 (H) 09/12/2019       Addendum:   Attest above note by Shona Raman, MS3   A1c within goal range, he does notice some variations in his glucoses when he is active versus sedentary.  He knows how to manage this fairly  well.  Renew fluconazole which has helped for foot fungus in the past.  I also recommended miconazole powder for use in between times that he takes the fluconazole, since the latter cannot be taken indefinitely, only for 2 to 4-week course.  He has a an annual physical scheduled later this year, and I will see him then.  He declines foot exam for me.    Hal Grimm, DO  Internal Medicine  [facility]

## 2021-06-02 VITALS — WEIGHT: 225.4 LBS | HEIGHT: 71 IN | BODY MASS INDEX: 31.56 KG/M2

## 2021-06-03 VITALS
WEIGHT: 232.56 LBS | SYSTOLIC BLOOD PRESSURE: 110 MMHG | OXYGEN SATURATION: 98 % | HEART RATE: 40 BPM | BODY MASS INDEX: 31.99 KG/M2 | DIASTOLIC BLOOD PRESSURE: 68 MMHG

## 2021-06-04 VITALS
OXYGEN SATURATION: 98 % | WEIGHT: 231.44 LBS | SYSTOLIC BLOOD PRESSURE: 116 MMHG | HEIGHT: 72 IN | DIASTOLIC BLOOD PRESSURE: 80 MMHG | BODY MASS INDEX: 31.35 KG/M2 | HEART RATE: 58 BPM

## 2021-06-04 NOTE — TELEPHONE ENCOUNTER
Patient has lab appt on 1/7/20 for blood work prior to physical on 1/9/20. No orders in, please advise.    Thanks

## 2021-06-04 NOTE — TELEPHONE ENCOUNTER
RN cannot approve Refill Request    RN can NOT refill this medication med is not covered by policy/route to provider. Last office visit: 9/12/2019 Hal Grimm DO Last Physical: 12/11/2018 Last MTM visit: Visit date not found Last visit same specialty: 9/12/2019 Hal Grimm DO.  Next visit within 3 mo: Visit date not found  Next physical within 3 mo: Visit date not found      Cass Zavaleta, [facility] Triage/Med Refill 12/5/2019    Requested Prescriptions   Pending Prescriptions Disp Refills     fluconazole (DIFLUCAN) 200 MG tablet [Pharmacy Med Name: FLUCONAZOLE 200 MG TABLET] 4 tablet 0     Sig: TAKE 1 TABLET (200 MG TOTAL) BY MOUTH ONCE A WEEK.       There is no refill protocol information for this order

## 2021-06-05 NOTE — TELEPHONE ENCOUNTER
Fabien scheduled 6 mo f/u for DM on his way out today and also scheduled a lab visit prior. Please enter orders for labs for routine DM f/u.

## 2021-06-05 NOTE — PROGRESS NOTES
[facility]    Faraz Horowitz   34 y.o. male    Date of Visit: 2/4/2020  Chief Complaint   Patient presents with     Annual Exam       ASSESSMENT/PLAN  1. Health maintenance examination     2. Type 1 diabetes mellitus without complication (H)       ---------------------------------------------    Overall well managed type I diabetic, BMI is 32, so I advised some weight loss, perhaps about 15 pounds.  He has been very busy coaching girls hockey, working, and trying to maintain a home life.  Hopefully he can pursue his goal to exercise in the middle of the day, hoping this will prevent the hyperglycemia he gets in the late afternoon or early evening even despite not eating since midday.    Given his age, would not push strongly for a statin, that we can reassess this each year.  Cholesterol very well managed.  Previously got labs in the days prior to our visit.    Return in about 7 months (around 9/4/2020) for Diabetes.      SUBJECTIVE  Faraz Horowitz is a 34-year-old man with history of type 1 diabetes who presents for annual physical.  He also asked to get his diabetes check today.    Stress raises blood glucoses.  Usually A1c in 6 range, last 7.1%.    He previous had foot fungus, now well controlled.  Is not taking the fluconazole on a regular basis.  He thinks that this will come back in the spring.    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    Work at the [employer] is busy, this is the 2-week stretch where he has to work 7 AM to 10 PM, sometimes on the weekend as well as they need to report to the [employer].    His wife is making him go to the dentist, he had a couple root canals on his front teeth        ROS A comprehensive review of systems was performed and was otherwise negative    Medications, allergies, and problem list were reviewed and updated    Patient Active Problem List   Diagnosis     Type 1 diabetes mellitus without complication (H)     Vitamin D deficiency disease      Multiple lipomas     Past Medical History:   Diagnosis Date     Achilles rupture, left      Acquired deflected nasal septum      Diabetes mellitus (H)      Hypertrophy of both inferior nasal turbinates      Past Surgical History:   Procedure Laterality Date     LIPOMA RESECTION N/A 3/20/2018    Procedure: EXCISION OF LEFT ARM LIPOMA/ RIGHT ARM LIPOMAS X3 LEFT THIGH AND RIGHT THIGH ;  Surgeon: Jeff Bonilla DO;  Location: [facility];  Service:      MOUTH SURGERY       Social History     Socioeconomic History     Marital status: Single     Spouse name: Not on file     Number of children: Not on file     Years of education: Not on file     Highest education level: Not on file   Occupational History     Not on file   Social Needs     Financial resource strain: Not on file     Food insecurity:     Worry: Not on file     Inability: Not on file     Transportation needs:     Medical: Not on file     Non-medical: Not on file   Tobacco Use     Smoking status: Never Smoker     Smokeless tobacco: Never Used   Substance and Sexual Activity     Alcohol use: Yes     Comment: rare     Drug use: No     Sexual activity: Not on file   Lifestyle     Physical activity:     Days per week: Not on file     Minutes per session: Not on file     Stress: Not on file   Relationships     Social connections:     Talks on phone: Not on file     Gets together: Not on file     Attends Rastafarian service: Not on file     Active member of club or organization: Not on file     Attends meetings of clubs or organizations: Not on file     Relationship status: Not on file     Intimate partner violence:     Fear of current or ex partner: Not on file     Emotionally abused: Not on file     Physically abused: Not on file     Forced sexual activity: Not on file   Other Topics Concern     Not on file   Social History Narrative     Not on file     Family History   Problem Relation Age of Onset     Heart disease Father         heart attacks      "Diabetes Father      Hypertension Father      Diabetes Maternal Grandmother      Hearing loss Maternal Grandmother      Dementia Maternal Grandmother      Arthritis Paternal Grandmother      Prostate cancer Paternal Grandfather        Current Outpatient Medications   Medication Sig Dispense Refill     ACCU-CHEK ROSANGELA PLUS METER Misc        blood glucose test (ONETOUCH ULTRA BLUE TEST STRIP) strips Use 1 each As Directed 4 (four) times a day. Dispense brand per patient's insurance at pharmacy discretion. 400 strip 3     fluconazole (DIFLUCAN) 200 MG tablet TAKE 1 TABLET (200 MG TOTAL) BY MOUTH ONCE A WEEK. 4 tablet 0     insulin glargine (LANTUS SOLOSTAR PEN) 100 unit/mL (3 mL) pen Inject 50-55 Units under the skin at bedtime. 15 adj dose pen 3     insulin lispro (HUMALOG KWIKPEN INSULIN) 100 unit/mL pen Inject 6 Units under the skin 3 (three) times a day before meals. 45 adj dose pen 3     miconazole (MICOTIN) 2 % powder Apply topically 2 (two) times a day as needed (athlete's foot).  0     No current facility-administered medications for this visit.        Allergies   Allergen Reactions     Augmentin [Amoxicillin-Pot Clavulanate] Nausea And Vomiting     Doxycycline Other (See Comments)     Elevated blood sugar       EXAM  Vitals:    02/04/20 1338   BP: 116/80   Patient Site: Left Arm   Patient Position: Sitting   Cuff Size: Adult Large   Pulse: (!) 58   SpO2: 98%   Weight: (!) 231 lb 7 oz (105 kg)   Height: 5' 11.5\" (1.816 m)         General: alert, no distress  HEENT: sclerae anicteric, moist oral mucosa  Heart: Regular rate and rhythm, no murmurs.  No pretibial edema.  Warm extremities  Lungs: Clear to auscultation bilat  Gastrointestinal: abdomen is soft, non-tender, non-distended.    Skin: warm/dry, no rashes  Neuro: no gross abnormalities        Hal Grimm, DO  Internal Medicine  [facility]  "

## 2021-06-06 DIAGNOSIS — E10.9 TYPE 1 DIABETES MELLITUS WITHOUT COMPLICATIONS (H): ICD-10-CM

## 2021-06-06 NOTE — TELEPHONE ENCOUNTER
Requested Prescriptions     Pending Prescriptions Disp Refills     LANTUS SOLOSTAR U-100 INSULIN 100 unit/mL (3 mL) pen [Pharmacy Med Name: LANTUS SOLOSTAR 100 UNIT/ML] 5 adj dose pen 1     Sig: INJECT 50-55 UNITS UNDER THE SKIN AT BEDTIME.     Last Appt: 2/4/20  Next Appt:  9.17.20  90 day supply pended

## 2021-06-06 NOTE — TELEPHONE ENCOUNTER
Requested Prescriptions     Pending Prescriptions Disp Refills     insulin glargine (LANTUS SOLOSTAR PEN) 100 unit/mL (3 mL) pen [Pharmacy Med Name: LANTUS SOLOSTAR 100 UNIT/ML] 45 adj dose pen 3     Sig: INJECT 50-55 UNITS UNDER THE SKIN AT BEDTIME.     Last Appt: 2/4/20  Next Appt:  9/17/20  90 day supply pended

## 2021-06-07 RX ORDER — INSULIN GLARGINE 100 [IU]/ML
INJECTION, SOLUTION SUBCUTANEOUS
Qty: 60 ML | Refills: 1 | Status: SHIPPED | OUTPATIENT
Start: 2021-06-07 | End: 2022-11-07

## 2021-06-07 NOTE — TELEPHONE ENCOUNTER
Prescription approved per [facility] Refill Protocol.    Stepan LARA RN....6/7/2021 10:11 AM

## 2021-06-08 NOTE — PROGRESS NOTES
"  ASSESSMENT:  1. Acute sinusitis, recurrence not specified, unspecified location  Due to severe facial pain and reworsening of symtoms, will treat as bacterial sinusitis- levofloxacin given allergies/intolerances to amox and doxy.  Tolerated levaquin before.      PLAN:  Patient Instructions   Levofloxacin (levaquin)-- daily x 7 days     Nasal spray-- Flonase (avoid Afrin)     No orders of the defined types were placed in this encounter.    There are no discontinued medications.    Return if symptoms worsen or fail to improve.    CHIEF COMPLAINT:  Chief Complaint   Patient presents with     Sinusitis       HISTORY OF PRESENT ILLNESS:  Faraz is a 31 y.o. male presenting to the clinic today with symptoms of upper respiratory infection. His symptoms of nasal congestion and neck pain began last week during a particularly stressful week at work. On Sunday he was very fatigued, spending most of the day in bed, and his nasal congestion worsened. Today his whole face feels swollen and painful. He denies ear pain but says that he does have ear fullness. He is treating with OTC decongestant for the past 2 days. He denies sore throat. He was unable to sleep last night due to the congestion.     Type 1 Diabetes: He reports good control over the past year. He did have a blood sugar of 400 yesterday.     REVIEW OF SYSTEMS:   He has had stomach reactions to Augmentin and exacerbation of diabetes with doxycycline. All other systems are negative.    PFSH:  He works in [employer] at [employer].     TOBACCO USE:  History   Smoking Status     Never Smoker   Smokeless Tobacco     Not on file       VITALS:  Vitals:    01/10/17 1129   BP: 110/84   Pulse: 64   Temp: 98.4  F (36.9  C)   Weight: 218 lb 14.4 oz (99.3 kg)   Height: 5' 11\" (1.803 m)     Wt Readings from Last 3 Encounters:   01/10/17 218 lb 14.4 oz (99.3 kg)   12/23/16 214 lb (97.1 kg)   03/18/16 217 lb 6 oz (98.6 kg)     Body mass index is 30.53 kg/(m^2).    PHYSICAL " EXAM:  General: Appears ill; congested; nasal voice.   HEENT: Tenderness to tapping in maxillary sinuses.   Mouth: Posterior pharynx normal in appearance.     ADDITIONAL HISTORY SUMMARIZED (2): Reviewed past note of Dr. Hinton 12/23/16.   DECISION TO OBTAIN EXTRA INFORMATION (1): None.   RADIOLOGY TESTS (1): None.  LABS (1): None.  MEDICINE TESTS (1): None.  INDEPENDENT REVIEW (2 each): None.       The visit lasted a total of 15 minutes face to face with the patient. Over 50% of the time was spent counseling and educating the patient about upper respiratory infection.    Yordy GLASER, am scribing for and in the presence of, Dr. Grimm.    Dr. Alfa GLASER, personally performed the services described in this documentation, as scribed by Yordy Jackson in my presence, and it is both accurate and complete.    MEDICATIONS:  Current Outpatient Prescriptions   Medication Sig Dispense Refill     ACCU-CHEK ROSANGELA PLUS METER Misc        ergocalciferol (ERGOCALCIFEROL) 50,000 unit capsule Take 1 capsule (50,000 Units total) by mouth once a week for 12 doses. 12 capsule 1     insulin degludec (TRESIBA FLEXTOUCH U-100) 100 unit/mL (3 mL) InPn Inject 50 Units under the skin daily. 4 Syringe 4     NOVOLOG FLEXPEN 100 unit/mL injection pen 6 units as directed before meal 4 Pre-filled Pen Syringe 4     ONETOUCH ULTRA TEST strips USE 1 EACH AS DIRECTED 4 (FOUR) TIMES A DAY. 400 strip 3     levoFLOXacin (LEVAQUIN) 750 MG tablet Take 1 tablet (750 mg total) by mouth daily for 7 days. 7 tablet 0     No current facility-administered medications for this visit.        Total data points:  2

## 2021-06-09 NOTE — TELEPHONE ENCOUNTER
RN cannot approve Refill Request    RN can NOT refill this medication med is not covered by policy/route to provider. Last office visit: Visit date not found Last Physical: Visit date not found Last MTM visit: Visit date not found Last visit same specialty: 9/12/2019 Hal Grimm DO.  Next visit within 3 mo: Visit date not found  Next physical within 3 mo: Visit date not found      Christy Carrillo, [facility] Triage/Med Refill 6/28/2020    Requested Prescriptions   Pending Prescriptions Disp Refills     fluconazole (DIFLUCAN) 200 MG tablet 4 tablet 0     Sig: Take 1 tablet (200 mg total) by mouth once a week.       There is no refill protocol information for this order

## 2021-06-11 NOTE — TELEPHONE ENCOUNTER
RN cannot approve Refill Request    RN can NOT refill this medication Protocol failed and NO refill given. Last office visit: 9/12/2019 Hal Grimm DO Last Physical: 2/4/2020 Last MTM visit: Visit date not found Last visit same specialty: 9/12/2019 Hal Grimm DO.  Next visit within 3 mo: Visit date not found  Next physical within 3 mo: Visit date not found      Xi Robert, [facility] Triage/Med Refill 9/14/2020    Requested Prescriptions   Pending Prescriptions Disp Refills     HUMALOG KWIKPEN INSULIN 100 unit/mL pen [Pharmacy Med Name: HUMALOG 100 UNITS/ML KWIKPEN] 15 mL 11     Sig: INJECT 6 UNITS UNDER THE SKIN 3 (THREE) TIMES A DAY BEFORE MEALS.       Insulin/GLP-1 Refill Protocol Failed - 9/13/2020  9:13 AM        Failed - Visit with PCP or prescribing provider visit in last 6 months     Last office visit with prescriber/PCP: Visit date not found OR same dept: Visit date not found OR same specialty: 9/12/2019 Hal Grimm DO Last physical: Visit date not found Last MTM visit: Visit date not found     Next appt within 3 mo: Visit date not found  Next physical within 3 mo: Visit date not found  Prescriber OR PCP: Hal Grimm DO  Last diagnosis associated with med order: 1. Type 1 diabetes mellitus (H)  - HUMALOG KWIKPEN INSULIN 100 unit/mL pen [Pharmacy Med Name: HUMALOG 100 UNITS/ML KWIKPEN]; INJECT 6 UNITS UNDER THE SKIN 3 (THREE) TIMES A DAY BEFORE MEALS.  Dispense: 15 mL; Refill: 11    If protocol passes may refill for 6 months if within 3 months of last provider visit (or a total of 9 months).              Failed - A1C in last 6 months     Hemoglobin A1c   Date Value Ref Range Status   01/30/2020 7.1 (H) 3.5 - 6.0 % Final               Passed - Microalbumin in last year     Microalbumin, Random Urine   Date Value Ref Range Status   01/30/2020 <0.50 0.00 - 1.99 mg/dL Final                  Passed - Blood pressure in last year     BP Readings from Last 1  Encounters:   02/04/20 116/80             Passed - Creatinine done in last year     Creatinine   Date Value Ref Range Status   01/30/2020 1.02 0.70 - 1.30 mg/dL Final

## 2021-06-12 NOTE — PROGRESS NOTES
"[facility] Clinic Note    Faraz Horowitz   31 y.o. male    Date of Visit: 7/28/2017  Chief Complaint   Patient presents with     Annual Exam     Paperwork       ASSESSMENT/PLAN  1. Health maintenance examination     2. Type 1 diabetes mellitus without complication  Glycosylated Hemoglobin A1c    Microalbumin, Random Urine    Comprehensive Metabolic Panel    Lipid Cascade   3. Tinea pedis of right foot       ---------------------------------------------    1.  Annual physical today, check fasting labs, with focus on diabetes    2.  Type 1 diabetes--erratic control with 50 units Tresiba and NovoLog with meals.  He is on a disproportionate amount of NovoLog compared with Tresiba, but when he was on Lantus at the same dose, it worked well.  He will call his insurance to see if Basaglar is covered.    3.  Recommended over-the-counter azole antifungal    Return in about 3 months (around 10/28/2017) for Recheck.      SUBJECTIVE  Faraz Horowitz is a 31-year-old man who presents for annual physical.    He has a history of type 1 diabetes, has been treated since 2003.  He was previously on Lantus 50 units at night, NovoLog with meals according to sliding scale.  Within the last 6 months or so, he was changed to Tresiba.  He cannot maintain his glucoses accurately anymore.  He had an hypoglycemic event to 29 and \"911\" was called.  He also had others that were in the 300s.    He has a history of lipomas, has one on his right forearm, wants to know if these can be removed.  He would be okay if I remove them or if surgery remove them.  He wants to wait until he has more.    He works for [email or web address] and [employer].  He is not very active.  He does not take aspirin.  He has not had problems with cholesterol before.    He has a DMV diabetic  form to fill out today.    ROS A comprehensive review of systems was performed and was otherwise negative    Medications, allergies, and problem list were reviewed and " updated    Patient Active Problem List   Diagnosis     Type 1 diabetes mellitus without complication     Vitamin D deficiency disease     Past Medical History:   Diagnosis Date     Acquired deflected nasal septum      Diabetes mellitus      Hypertrophy of both inferior nasal turbinates      Past Surgical History:   Procedure Laterality Date     MOUTH SURGERY       Social History     Social History     Marital status: Single     Spouse name: N/A     Number of children: N/A     Years of education: N/A     Occupational History     Not on file.     Social History Main Topics     Smoking status: Never Smoker     Smokeless tobacco: Not on file     Alcohol use Yes      Comment: rare     Drug use: No     Sexual activity: Not on file     Other Topics Concern     Not on file     Social History Narrative     Family History   Problem Relation Age of Onset     Heart disease Father      Diabetes Father      Hypertension Father      Diabetes Maternal Grandmother      Hearing loss Maternal Grandmother      Dementia Maternal Grandmother      Arthritis Paternal Grandmother      Prostate cancer Paternal Grandfather        Current Outpatient Prescriptions   Medication Sig Dispense Refill     ACCU-CHEK ROSANGELA PLUS METER Misc        NOVOLOG FLEXPEN 100 unit/mL injection pen INJECT 6 UNITS AS DIRECTED BEFORE MEAL 15 Pre-filled Pen Syringe 0     ONETOUCH ULTRA TEST strips USE 1 EACH AS DIRECTED 4 (FOUR) TIMES A DAY. 400 strip 3     TRESIBA FLEXTOUCH U-100 100 unit/mL (3 mL) InPn INJECT 50 UNITS UNDER THE SKIN DAILY. 15 Syringe 0     No current facility-administered medications for this visit.        Allergies   Allergen Reactions     Augmentin [Amoxicillin-Pot Clavulanate] Nausea And Vomiting     Doxycycline Other (See Comments)     Elevated blood sugar       EXAM  Vitals:    07/28/17 0925   BP: 110/70   Patient Site: Left Arm   Patient Position: Sitting   Cuff Size: Adult Regular   Pulse: 60   SpO2: 96%   Weight: (!) 228 lb 4.8 oz (103.6  "kg)   Height: 5' 11.5\" (1.816 m)     General: alert, no distress  HEENT: sclerae anicteric, moist oral mucosa  Heart: Regular rate and rhythm, no murmurs.  No pretibial edema.  Warm extremities  Lungs: Clear to auscultation bilat  Gastrointestinal: abdomen is soft, non-tender, non-distended.    Skin: warm/dry, small 1 cm lipoma right forearm, small scaly rash right medial calcaneus consistent with tinea  Neuro: no gross abnormalities       RESULTS REVIEWED:     ANALYSIS AND SUMMARY OF OLD RECORDS, NOTES AND CONSULTS (2): Reviewed family medicine note from 12/23/16    A1c and labs ordered     Data points  3     Hal Grimm DO  Internal Medicine  [facility]   "

## 2021-06-14 NOTE — TELEPHONE ENCOUNTER
Former patient of Alfa & has not established care with another provider.  Please assign refill request to covering provider per Clinic standard process.  RN cannot approve Refill Request    RN can NOT refill this medication Protocol failed and NO refill given. Last office visit: 9/12/2019 Hal Grimm DO Last Physical: 2/4/2020 Last MTM visit: Visit date not found Last visit same specialty: 9/12/2019 Hal Grimm DO.  Next visit within 3 mo: Visit date not found  Next physical within 3 mo: Visit date not found      Xi Robert, [facility] Triage/Med Refill 1/28/2021    Requested Prescriptions   Pending Prescriptions Disp Refills     LANTUS SOLOSTAR U-100 INSULIN 100 unit/mL (3 mL) pen [Pharmacy Med Name: LANTUS SOLOSTAR 100 UNIT/ML] 15 mL 3     Sig: INJECT 50-55 UNITS UNDER THE SKIN AT BEDTIME.       Insulin/GLP-1 Refill Protocol Failed - 1/27/2021  6:23 PM        Failed - Visit with PCP or prescribing provider visit in last 6 months     Last office visit with prescriber/PCP: Visit date not found OR same dept: Visit date not found OR same specialty: 9/12/2019 Hal Grimm DO Last physical: Visit date not found Last MTM visit: Visit date not found     Next appt within 3 mo: Visit date not found  Next physical within 3 mo: Visit date not found  Prescriber OR PCP: Hal Grimm DO  Last diagnosis associated with med order: 1. Type 1 diabetes mellitus (H)  - LANTUS SOLOSTAR U-100 INSULIN 100 unit/mL (3 mL) pen [Pharmacy Med Name: LANTUS SOLOSTAR 100 UNIT/ML]; INJECT 50-55 UNITS UNDER THE SKIN AT BEDTIME.  Dispense: 15 mL; Refill: 3    If protocol passes may refill for 6 months if within 3 months of last provider visit (or a total of 9 months).              Failed - A1C in last 6 months     Hemoglobin A1c   Date Value Ref Range Status   01/30/2020 7.1 (H) 3.5 - 6.0 % Final               Passed - Microalbumin in last year     Microalbumin, Random Urine   Date Value Ref Range  Status   01/30/2020 <0.50 0.00 - 1.99 mg/dL Final                  Passed - Blood pressure in last year     BP Readings from Last 1 Encounters:   02/04/20 116/80             Passed - Creatinine done in last year     Creatinine   Date Value Ref Range Status   01/30/2020 1.02 0.70 - 1.30 mg/dL Final

## 2021-06-15 NOTE — PROGRESS NOTES
On license of [facility] Clinic Note    Faraz Horowitz   32 y.o. male    Date of Visit: 2/6/2018  Chief Complaint   Patient presents with     Follow-up     FASTING       ASSESSMENT/PLAN  1. Type 1 diabetes mellitus without complication  Basic Metabolic Panel    Lipid Cascade    Glycosylated Hemoglobin A1c    Microalbumin, Random Urine    Ambulatory referral to Podiatry    CANCELED: Ambulatory referral to Podiatry   2. Lipoma  Ambulatory referral to General Surgery   3. Rash  Ambulatory referral to Podiatry    CANCELED: Ambulatory referral to Podiatry     ---------------------------------------------    1.  Type 1 diabetes, A1c is 6.8, although the insulin dosing is somewhat unconventional, seems to be working for him currently.    2.  Referred to surgery for elective removal of lipomas, especially one on the right posterior thigh.  He has lipomas on both arms and thighs, however.    3.  He has a rash on his feet that he would like to see a podiatrist for.  Referral was placed.  He would like to see someone near the [facility].    Return in about 3 months (around 5/6/2018).      SUBJECTIVE  Faraz Horowitz is a 32-year-old man who presents for diabetes follow-up, fasting labs.    He currently works at [employer], has been dealing with a lot of fuss with the Super Bowl.  He does mention that sometimes his 2 hour postprandial numbers from after lunch are about 190, but after meeting in the afternoon, they will spike at 390 without eating anything additional.  He is up to Basaglar 60 units once a day, but still only taking 5-6 units of NovoLog with meals.  He has noticed that this seems to be a better way of controlling his blood sugars then taking a more proportionate amount of NovoLog to Basaglar.    He does not have blood sugars with him currently, they are in the car.    His prime concern is lumps in his arm, also one on the right posterior thigh which has been uncomfortable for him.  We have talked about  "these lipomas and they could be electively removed.  He seems ready for this now.     He has been wearing orthotics, saw me for tinea pedis, and tried treatment for that.  Now he is at slightly different problem, he has a rash on the undersurface of his feet, on the plantar aspect.  He is not sure what to do, he feels like his feet are too moist most of the time.  He would like to see a podiatrist for this.  It does not seem that he has discomfort when walking, however.      Medications, allergies, and problem list were reviewed and updated    Patient Active Problem List   Diagnosis     Type 1 diabetes mellitus without complication     Vitamin D deficiency disease     Lipoma     Past Medical History:   Diagnosis Date     Acquired deflected nasal septum      Diabetes mellitus      Hypertrophy of both inferior nasal turbinates      Current Outpatient Prescriptions   Medication Sig Dispense Refill     insulin glargine (LANTUS; BASAGLAR) 100 unit/mL (3 mL) pen Inject 60 Units under the skin at bedtime. 15 adj dose pen 3     NOVOLOG FLEXPEN 100 unit/mL injection pen INJECT 6 UNITS AS DIRECTED BEFORE MEAL 15 Pre-filled Pen Syringe 3     ONETOUCH ULTRA TEST strips USE 1 EACH AS DIRECTED 4 (FOUR) TIMES A DAY. 400 strip 3     ACCU-CHEK ROSANGELA PLUS METER Misc        No current facility-administered medications for this visit.      Allergies   Allergen Reactions     Augmentin [Amoxicillin-Pot Clavulanate] Nausea And Vomiting     Doxycycline Other (See Comments)     Elevated blood sugar       EXAM  Vitals:    02/06/18 0927   BP: 110/70   Patient Site: Left Arm   Patient Position: Sitting   Cuff Size: Adult Regular   Pulse: 74   SpO2: 98%   Weight: (!) 225 lb (102.1 kg)   Height: 5' 11.46\" (1.815 m)     General: Alert, no distress  Skin: Small subcutaneous areas of induration, 1 on the volar aspect of the right forearm measuring about 3 x 4 cm consistent with lipoma.    Results reviewed:     Recent Results (from the past 240 " hour(s))   Basic Metabolic Panel   Result Value Ref Range    Sodium 142 136 - 145 mmol/L    Potassium 4.3 3.5 - 5.0 mmol/L    Chloride 101 98 - 107 mmol/L    CO2 28 22 - 31 mmol/L    Anion Gap, Calculation 13 5 - 18 mmol/L    Glucose 92 70 - 125 mg/dL    Calcium 10.0 8.5 - 10.5 mg/dL    BUN 11 8 - 22 mg/dL    Creatinine 0.94 0.70 - 1.30 mg/dL    GFR MDRD Af Amer >60 >60 mL/min/1.73m2    GFR MDRD Non Af Amer >60 >60 mL/min/1.73m2   Lipid Cascade   Result Value Ref Range    Cholesterol 192 <=199 mg/dL    Triglycerides 91 <=149 mg/dL    HDL Cholesterol 48 >=40 mg/dL    LDL Calculated 126 <=129 mg/dL    Patient Fasting > 8hrs? Yes    Glycosylated Hemoglobin A1c   Result Value Ref Range    Hemoglobin A1c 6.8 (H) 3.5 - 6.0 %   Microalbumin, Random Urine   Result Value Ref Range    Microalbumin, Random Urine 0.93 0.00 - 1.99 mg/dL    Creatinine, Urine 360.3 mg/dL    Microalbumin/Creatinine Ratio Random Urine 2.6 <=19.9 mg/g          Hal Grimm DO  Internal Medicine  [facility]

## 2021-06-16 NOTE — PROGRESS NOTES
HPI: Faraz Horowitz is a 32 y.o. male referred to see me by Hal Grimm DO for a lump in the left upper extremity right upper extremity and bilateral thigh region.  He notes  a several year history of the lumps and relates them to trauma from playing hockey.  He states that they are now increasing in size and causing discomfort he would like to have the mole removed. denies any nausea, vomiting, fevers, chills or any other symptoms at present.       Allergies:Augmentin [amoxicillin-pot clavulanate] and Doxycycline    Past Medical History:   Diagnosis Date     Acquired deflected nasal septum      Diabetes mellitus      Hypertrophy of both inferior nasal turbinates        Past Surgical History:   Procedure Laterality Date     MOUTH SURGERY         CURRENT MEDS:    Current Outpatient Prescriptions:      ACCU-CHEK ROSANGELA PLUS METER Misc, , Disp: , Rfl:      insulin glargine (LANTUS; BASAGLAR) 100 unit/mL (3 mL) pen, Inject 60 Units under the skin at bedtime., Disp: 15 adj dose pen, Rfl: 3     NOVOLOG FLEXPEN 100 unit/mL injection pen, INJECT 6 UNITS AS DIRECTED BEFORE MEAL, Disp: 15 Pre-filled Pen Syringe, Rfl: 3     ONETOUCH ULTRA TEST strips, USE 1 EACH AS DIRECTED 4 (FOUR) TIMES A DAY., Disp: 400 strip, Rfl: 3    Family History   Problem Relation Age of Onset     Heart disease Father      Diabetes Father      Hypertension Father      Diabetes Maternal Grandmother      Hearing loss Maternal Grandmother      Dementia Maternal Grandmother      Arthritis Paternal Grandmother      Prostate cancer Paternal Grandfather         reports that he has never smoked. He has never used smokeless tobacco. He reports that he drinks alcohol. He reports that he does not use illicit drugs.    Review of Systems:  The 12 system review is within normal limits except for as mentioned above.  General ROS: No complaints or constitutional symptoms  Ophthalmic ROS: No complaints of visual changes  Skin: As per HPI  Endocrine: No  "complaints or symptoms  Hematologic/Lymphatic: No symptoms or complaints  Psychiatric: No symptoms or complaints  Respiratory ROS: no cough, shortness of breath, or wheezing  Cardiovascular ROS: no chest pain or dyspnea on exertion  Gastrointestinal ROS: No complaints of pain or other symptoms  Genito-Urinary ROS: no dysuria, trouble voiding, or hematuria  Musculoskeletal ROS: no joint or muscle pain  Neurological ROS: no TIA or stroke symptoms      EXAM:  /73 (Patient Site: Right Arm, Patient Position: Sitting, Cuff Size: Adult Large)  Pulse (!) 49  Ht 5' 11.25\" (1.81 m)  Wt (!) 225 lb 4.8 oz (102.2 kg)  SpO2 97%  BMI 31.2 kg/m2  GENERAL: Well developed male, No acute distress, pleasant and conversant   EYES: Pupils equal, round and reactive, no scleral icterus  CARDIAC: Regular rate and rhythm, no obvious murmurs noted  CHEST/LUNG: Clear to ascultation bilaterally, No ronchi, No wheezes  ABDOMEN: Soft, non tender, no masses  SKIN: Pink, warm and dry  NEURO:No focal deficits, ambulatory  MUSCULOSKELETAL: Left upper extremity- 1 cm lipomatous mass in the medial upper extremity; right upper extremity- 3 small 1 cm masses in the distal forearm; left lateral thigh, 2-3 cm soft mass in the subcutaneous tissues; right posterior thigh 1-2 cm mass in the mid hamstring region      LABS:  Lab Results   Component Value Date    WBC 9.3 03/10/2016    HGB 17.4 03/10/2016    HCT 52.3 03/10/2016    MCV 90 03/10/2016     03/10/2016     INR/Prothrombin Time      Lab Results   Component Value Date    ALT 14 07/28/2017    AST 14 07/28/2017    ALKPHOS 67 07/28/2017    BILITOT 1.0 07/28/2017         Assessment/Plan:   Faraz Horowitz is a 32 y.o. male with signs and symptoms consistent with multiple lipomas.  I have explained the pathophysiology of lipomas in detail as well as the surgical versus non-operative management strategies.      The risks of surgery were discussed in detail which include, but are not limited " to, bleeding, infection, blood clots, stroke, heart attack and death.  Additionally, the risks of non operative management were discussed which include, but are not limited to,enlargin of the mass, infection and pain.      He understands everything which was discussed and has consented to proceed with an excision of the mass.  We will schedule surgery accordingly.       Jeff Bonilla D.O. [facility]  [phone]  [facility] Department of Surgery

## 2021-06-16 NOTE — PROGRESS NOTES
Excision education & surgery packet provided to    Zunilda Mesa CMA ([facility])     Ph: [phone]    Fx: [phone]

## 2021-06-22 NOTE — PROGRESS NOTES
[facility] Note    Faraz Horowitz   33 y.o. male    Date of Visit: 12/11/2018  Chief Complaint   Patient presents with     Annual Exam     Diabetes       ASSESSMENT/PLAN  1. Health maintenance examination  Lipid Cascade    Basic Metabolic Panel    Hepatic Profile   2. Type 1 diabetes mellitus without complication (H)  Glycosylated Hemoglobin A1c    Microalbumin, Random Urine    insulin glargine (LANTUS; BASAGLAR) 100 unit/mL (3 mL) pen     ---------------------------------------------    1.  Check fasting labs today, he plans to lose a little bit of weight, he Luciano lost some from last year.  He has been sedentary this summer because of Achilles tendon rupture on the left, recovered well from that, nonsurgical treatment.    2.  He is overdue for diabetic eye exam, I reminded him of this.  He will try to do microalbumin if he will be able to produce a urine sample.  He will call back on refills for his Lantus and NovoLog, because he needs to confirm where to send the prescription.  He does not need the prescription quite yet.  Last A1c was 6.8.  He is on a disproportionate amount of basal insulin, but seems to work for him.    Return in about 6 months (around 6/11/2019) for Diabetes.      SUBJECTIVE  Faraz Horowitz is here for annual physical.      He tore the left achilles this summer.  This was treated with physical therapy.  He injured it when playing in the street with his daughter and wife.    There is foot fungus treated with fluconazole.  This is more or less resolved.    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      He had lipomas removed this summer.  They have not come back like they did when he previously had them removed.    ROS A comprehensive review of systems was performed and was otherwise negative    Medications, allergies, and problem list were reviewed and updated    Patient Active Problem List   Diagnosis     Type 1 diabetes mellitus  without complication (H)     Vitamin D deficiency disease     Multiple lipomas     Past Medical History:   Diagnosis Date     Achilles rupture, left      Acquired deflected nasal septum      Diabetes mellitus (H)      Hypertrophy of both inferior nasal turbinates      Past Surgical History:   Procedure Laterality Date     LIPOMA RESECTION N/A 3/20/2018    Procedure: EXCISION OF LEFT ARM LIPOMA/ RIGHT ARM LIPOMAS X3 LEFT THIGH AND RIGHT THIGH ;  Surgeon: Jeff Bonilla DO;  Location: [facility];  Service:      MOUTH SURGERY       Social History     Socioeconomic History     Marital status: Single     Spouse name: Not on file     Number of children: Not on file     Years of education: Not on file     Highest education level: Not on file   Social Needs     Financial resource strain: Not on file     Food insecurity - worry: Not on file     Food insecurity - inability: Not on file     Transportation needs - medical: Not on file     Transportation needs - non-medical: Not on file   Occupational History     Not on file   Tobacco Use     Smoking status: Never Smoker     Smokeless tobacco: Never Used   Substance and Sexual Activity     Alcohol use: Yes     Comment: rare     Drug use: No     Sexual activity: Not on file   Other Topics Concern     Not on file   Social History Narrative     Not on file     Family History   Problem Relation Age of Onset     Heart disease Father         heart attacks     Diabetes Father      Hypertension Father      Diabetes Maternal Grandmother      Hearing loss Maternal Grandmother      Dementia Maternal Grandmother      Arthritis Paternal Grandmother      Prostate cancer Paternal Grandfather        Current Outpatient Medications   Medication Sig Dispense Refill     ACCU-CHEK ROSANGELA PLUS METER Misc        fluconazole (DIFLUCAN) 200 MG tablet Take 200 mg by mouth once a week.  2     ONETOUCH ULTRA BLUE TEST STRIP strips USE 1 EACH AS DIRECTED 4 (FOUR) TIMES A DAY. 400 strip 3     insulin  "glargine (LANTUS; BASAGLAR) 100 unit/mL (3 mL) pen Inject 50-55 Units under the skin at bedtime. 15 adj dose pen 3     NOVOLOG FLEXPEN U-100 INSULIN 100 unit/mL injection pen INJECT 6 UNITS UNDER SKIN AS DIRECTED BEFORE MEAL 15 Pre-filled Pen Syringe 3     No current facility-administered medications for this visit.        Allergies   Allergen Reactions     Augmentin [Amoxicillin-Pot Clavulanate] Nausea And Vomiting     Doxycycline Other (See Comments)     Elevated blood sugar       EXAM  Vitals:    12/11/18 0950   BP: 120/84   Patient Site: Left Arm   Patient Position: Sitting   Cuff Size: Adult Regular   Pulse: (!) 51   SpO2: 98%   Weight: (!) 225 lb 6.4 oz (102.2 kg)   Height: 5' 11.5\" (1.816 m)         General: alert, no distress  HEENT: sclerae anicteric, moist oral mucosa  Heart: Regular rate and rhythm, no murmurs.  No pretibial edema.  Warm extremities  Lungs: Clear to auscultation bilat  Gastrointestinal: abdomen is soft, non-tender, non-distended.    Skin: warm/dry, no rashes  Neuro: no gross abnormalities  MSK: mild swelling achilles heel (left)       Hal Grimm,   Internal Medicine  [facility]  "

## 2021-06-23 NOTE — TELEPHONE ENCOUNTER
Refill Approved    Rx renewed per Medication Renewal Policy. Medication was last renewed on 12/11/18.    Gracie Alexander, [facility] Connection Triage/Med Refill 1/10/2019     Requested Prescriptions   Pending Prescriptions Disp Refills     insulin glargine (LANTUS; BASAGLAR) 100 unit/mL (3 mL) pen 15 adj dose pen 3     Sig: Inject 50-55 Units under the skin at bedtime.    Insulin/GLP-1 Refill Protocol Passed - 1/10/2019 10:32 PM       Passed - Visit with PCP or prescribing provider visit in last 6 months    Last office visit with prescriber/PCP: Visit date not found OR same dept: 2/6/2018 Hal Grimm DO OR same specialty: 2/6/2018 Hal Grimm DO Last physical: 12/11/2018 Last MTM visit: Visit date not found     Next appt within 3 mo: Visit date not found  Next physical within 3 mo: Visit date not found  Prescriber OR PCP: Hal Grimm DO  Last diagnosis associated with med order: 1. Type 1 diabetes mellitus without complication (H)  - insulin glargine (LANTUS; BASAGLAR) 100 unit/mL (3 mL) pen; Inject 50-55 Units under the skin at bedtime.  Dispense: 15 adj dose pen; Refill: 3    If protocol passes may refill for 6 months if within 3 months of last provider visit (or a total of 9 months).             Passed - A1C in last 6 months    Hemoglobin A1c   Date Value Ref Range Status   12/11/2018 6.5 (H) 3.5 - 6.0 % Final              Passed - Microalbumin in last year    Microalbumin, Random Urine   Date Value Ref Range Status   12/11/2018 <0.50 0.00 - 1.99 mg/dL Final                 Passed - Blood pressure in last year    BP Readings from Last 1 Encounters:   12/11/18 120/84            Passed - Creatinine done in last year    Creatinine   Date Value Ref Range Status   12/11/2018 0.93 0.70 - 1.30 mg/dL Final           Refused Prescriptions Disp Refills     BASAGLAR KWIKPEN U-100 INSULIN 100 unit/mL (3 mL) pen [Pharmacy Med Name: BASAGLAR 100 UNIT/ML KWIKPEN]  3     Sig: INJECT 60 UNITS UNDER THE  SKIN AT BEDTIME.    Insulin/GLP-1 Refill Protocol Passed - 1/10/2019 10:32 PM       Passed - Visit with PCP or prescribing provider visit in last 6 months    Last office visit with prescriber/PCP: Visit date not found OR same dept: 2/6/2018 Hal Grimm DO OR same specialty: 2/6/2018 Hal Grimm DO Last physical: 12/11/2018 Last MTM visit: Visit date not found     Next appt within 3 mo: Visit date not found  Next physical within 3 mo: Visit date not found  Prescriber OR PCP: Hal Grimm DO  Last diagnosis associated with med order: 1. Type 1 diabetes mellitus without complication (H)  - insulin glargine (LANTUS; BASAGLAR) 100 unit/mL (3 mL) pen; Inject 50-55 Units under the skin at bedtime.  Dispense: 15 adj dose pen; Refill: 3    If protocol passes may refill for 6 months if within 3 months of last provider visit (or a total of 9 months).             Passed - A1C in last 6 months    Hemoglobin A1c   Date Value Ref Range Status   12/11/2018 6.5 (H) 3.5 - 6.0 % Final              Passed - Microalbumin in last year    Microalbumin, Random Urine   Date Value Ref Range Status   12/11/2018 <0.50 0.00 - 1.99 mg/dL Final                 Passed - Blood pressure in last year    BP Readings from Last 1 Encounters:   12/11/18 120/84            Passed - Creatinine done in last year    Creatinine   Date Value Ref Range Status   12/11/2018 0.93 0.70 - 1.30 mg/dL Final

## 2021-06-23 NOTE — TELEPHONE ENCOUNTER
Dr Grimm     Fax received from [employer] , [employer] insurance plan does not cover Novolog     Current RX: Novolog     Insurance plan covers the following alternatives: Humalog    Please send new RX for alternative    Thank you

## 2021-08-23 ENCOUNTER — TRANSFERRED RECORDS (OUTPATIENT)
Dept: HEALTH INFORMATION MANAGEMENT | Facility: CLINIC | Age: 36
End: 2021-08-23

## 2021-08-23 LAB — RETINOPATHY: NEGATIVE

## 2021-08-31 ENCOUNTER — TELEPHONE (OUTPATIENT)
Dept: ENDOCRINOLOGY | Facility: CLINIC | Age: 36
End: 2021-08-31

## 2021-08-31 NOTE — TELEPHONE ENCOUNTER
HbA1C ordered for next office visit (future order)  Would you like any other labs checked prior to appointment on 10/1?     Thank you,   Catina MOTT RN   Specialty Clinics

## 2021-08-31 NOTE — TELEPHONE ENCOUNTER
[facility] Call Center    Phone Message    May a detailed message be left on voicemail: yes     Reason for Call: Order(s): Other:   Reason for requested: labs  Date needed: whenever possible  Provider name: Bhavin    Pt would like to complete labs for upcoming appt.  Please place orders and send mychart message to pt so he can schedule.       Action Taken: Message routed to:  Other: endo    Travel Screening: Not Applicable

## 2021-09-05 ENCOUNTER — HEALTH MAINTENANCE LETTER (OUTPATIENT)
Age: 36
End: 2021-09-05

## 2021-10-07 ENCOUNTER — TELEPHONE (OUTPATIENT)
Dept: ENDOCRINOLOGY | Facility: CLINIC | Age: 36
End: 2021-10-07

## 2021-10-07 NOTE — TELEPHONE ENCOUNTER
Received fax: [place] [employer] Request for diabetic statement. Fax appears to be missing signature page. Spoke with patient-will be re faxing. Pt would like to  signed form at [facility] [facility] when completed. Pt needs form signed before November 1st.       Kaden Hinojosa  Specialty Clinic CSS

## 2021-10-08 NOTE — TELEPHONE ENCOUNTER
Pt dropped off forms ([place] [employer])    Placed on provider's desk for review/signature.    [facility] CSS

## 2021-10-11 ENCOUNTER — VIRTUAL VISIT (OUTPATIENT)
Dept: ENDOCRINOLOGY | Facility: CLINIC | Age: 36
End: 2021-10-11
Payer: COMMERCIAL

## 2021-10-11 ENCOUNTER — LAB (OUTPATIENT)
Dept: LAB | Facility: CLINIC | Age: 36
End: 2021-10-11
Payer: COMMERCIAL

## 2021-10-11 ENCOUNTER — MYC MEDICAL ADVICE (OUTPATIENT)
Dept: ENDOCRINOLOGY | Facility: CLINIC | Age: 36
End: 2021-10-11

## 2021-10-11 DIAGNOSIS — B36.9 FUNGAL SKIN INFECTION: ICD-10-CM

## 2021-10-11 DIAGNOSIS — E10.9 TYPE 1 DIABETES MELLITUS WITHOUT COMPLICATION (H): ICD-10-CM

## 2021-10-11 DIAGNOSIS — E10.9 TYPE 1 DIABETES MELLITUS WITHOUT COMPLICATIONS (H): ICD-10-CM

## 2021-10-11 LAB — HBA1C MFR BLD: 6.3 % (ref 0–5.6)

## 2021-10-11 PROCEDURE — 95251 CONT GLUC MNTR ANALYSIS I&R: CPT | Performed by: INTERNAL MEDICINE

## 2021-10-11 PROCEDURE — 83036 HEMOGLOBIN GLYCOSYLATED A1C: CPT

## 2021-10-11 PROCEDURE — 99214 OFFICE O/P EST MOD 30 MIN: CPT | Mod: GT | Performed by: INTERNAL MEDICINE

## 2021-10-11 PROCEDURE — 36415 COLL VENOUS BLD VENIPUNCTURE: CPT

## 2021-10-11 RX ORDER — PROCHLORPERAZINE 25 MG/1
1 SUPPOSITORY RECTAL
Qty: 9 EACH | Refills: 3 | Status: SHIPPED | OUTPATIENT
Start: 2021-10-11 | End: 2022-12-13

## 2021-10-11 RX ORDER — INSULIN LISPRO 100 [IU]/ML
INJECTION, SOLUTION INTRAVENOUS; SUBCUTANEOUS
Qty: 60 ML | Refills: 3 | Status: SHIPPED | OUTPATIENT
Start: 2021-10-11 | End: 2021-12-23

## 2021-10-11 RX ORDER — FLUCONAZOLE 200 MG/1
200 TABLET ORAL DAILY
Qty: 30 TABLET | Refills: 2 | Status: SHIPPED | OUTPATIENT
Start: 2021-10-11 | End: 2022-05-25

## 2021-10-11 RX ORDER — PROCHLORPERAZINE 25 MG/1
1 SUPPOSITORY RECTAL
Qty: 2 EACH | Refills: 1 | Status: SHIPPED | OUTPATIENT
Start: 2021-10-11 | End: 2022-12-13

## 2021-10-11 RX ORDER — PROCHLORPERAZINE 25 MG/1
1 SUPPOSITORY RECTAL DAILY
Qty: 1 EACH | Refills: 0 | Status: SHIPPED | OUTPATIENT
Start: 2021-10-11 | End: 2022-12-13
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
## 2021-10-12 NOTE — TELEPHONE ENCOUNTER
[facility], can someone please scan and send this form to Dr. Delcid? Patient is asking for it.    Thanks,    Stepan LARA RN....10/12/2021 10:05 AM

## 2021-10-12 NOTE — TELEPHONE ENCOUNTER
Form was dropped off at the [facility]. RN sent a message to [place] team to send form to Dr. Delcid. (see 10/7/21 phone encounter.)    Stepan LARA RN....10/12/2021 10:06 AM

## 2021-10-12 NOTE — TELEPHONE ENCOUNTER
Called pt and he requested I send him a copy via email and send the original to the DMV.  Judy Kirk CMA [facility] 10/12/2021 1:00 PM

## 2021-12-21 DIAGNOSIS — E10.9 TYPE 1 DIABETES MELLITUS WITHOUT COMPLICATION (H): ICD-10-CM

## 2021-12-21 NOTE — TELEPHONE ENCOUNTER
Requested Prescriptions   Pending Prescriptions Disp Refills     insulin lispro (HUMALOG KWIKPEN) 100 UNIT/ML (1 unit dial) KWIKPEN 60 mL 3     Sig: Up to 50 units a day.       There is no refill protocol information for this order        Alternative requested: The prescribed medication is not covered by insurance.   Suggested alternatives:   FIASP 100 UNIT/ML FLEXTOUCH,NOVOLOG 100 UNIT/ML FLEXPEN

## 2021-12-23 ENCOUNTER — TELEPHONE (OUTPATIENT)
Dept: ENDOCRINOLOGY | Facility: CLINIC | Age: 36
End: 2021-12-23
Payer: COMMERCIAL

## 2021-12-23 DIAGNOSIS — E10.9 TYPE 1 DIABETES MELLITUS WITHOUT COMPLICATION (H): Primary | ICD-10-CM

## 2021-12-23 RX ORDER — INSULIN LISPRO 100 [IU]/ML
INJECTION, SOLUTION INTRAVENOUS; SUBCUTANEOUS
Qty: 60 ML | Refills: 3 | Status: SHIPPED | OUTPATIENT
Start: 2021-12-23 | End: 2022-12-13

## 2021-12-23 NOTE — TELEPHONE ENCOUNTER
Humalog is no longer the preferred insulin undert Fabien's plan.  Please send an updated script for Novolog or advise if PA is necessary.   Thank you!

## 2021-12-23 NOTE — TELEPHONE ENCOUNTER
"Prescription approved per [facility] Refill Protocol.  Requested Prescriptions   Pending Prescriptions Disp Refills     insulin lispro (HUMALOG KWIKPEN) 100 UNIT/ML (1 unit dial) KWIKPEN 60 mL 3     Sig: Up to 50 units a day.       Short Acting Insulin Protocol Passed - 12/21/2021 10:15 AM        Passed - Serum creatinine on file in past 12 months     Recent Labs   Lab Test 01/05/21  0907   CR 1.09       Ok to refill medication if creatinine is low          Passed - HgbA1C in past 3 or 6 months     If HgbA1C is 8 or greater, it needs to be on file within the past 3 months.  If less than 8, must be on file within the past 6 months.     Recent Labs   Lab Test 10/11/21  0816   A1C 6.3*             Passed - Medication is active on med list        Passed - Patient is age 18 or older        Passed - Recent (6 mo) or future (30 days) visit within the authorizing provider's specialty     Patient had office visit in the last 6 months or has a visit in the next 30 days with authorizing provider or within the authorizing provider's specialty.  See \"Patient Info\" tab in inbasket, or \"Choose Columns\" in Meds & Orders section of the refill encounter.               Kalee Wise RN on 12/23/2021 at 10:54 AM    "

## 2021-12-26 RX ORDER — INSULIN ASPART 100 [IU]/ML
INJECTION, SOLUTION INTRAVENOUS; SUBCUTANEOUS
Qty: 45 ML | Refills: 3 | Status: SHIPPED | OUTPATIENT
Start: 2021-12-26 | End: 2023-02-22

## 2022-01-07 ENCOUNTER — MYC MEDICAL ADVICE (OUTPATIENT)
Dept: ENDOCRINOLOGY | Facility: CLINIC | Age: 37
End: 2022-01-07
Payer: COMMERCIAL

## 2022-01-07 ENCOUNTER — TELEPHONE (OUTPATIENT)
Dept: ENDOCRINOLOGY | Facility: CLINIC | Age: 37
End: 2022-01-07
Payer: COMMERCIAL

## 2022-01-07 NOTE — TELEPHONE ENCOUNTER
insulin glargine (LANTUS PEN) 100 UNIT/ML pen 60 mL 3 12/13/2021  No   Sig - Route: Inject 44 Units Subcutaneous At Bedtime - Subcutaneous   Sent to pharmacy as: Insulin Glargine 100 UNIT/ML Subcutaneous Solution Pen-injector (LANTUS PEN     Team can we start a PA please. See pt my chart message from 1/7/22 for two other meds he has tried in the past.    Justine JONES RN Specialty Triage 1/7/2022 11:45 AM

## 2022-01-10 NOTE — TELEPHONE ENCOUNTER
Central Prior Authorization Team   Phone: [phone]      PA Initiation    Medication: lantus  Insurance Company: [employer] - Phone [phone] Fax [phone]  Pharmacy Filling the Rx: [id number] - [place], [place] - [address] [place] [address]  Filling Pharmacy Phone: [phone]  Filling Pharmacy Fax:    Start Date: 1/10/2022

## 2022-01-11 NOTE — TELEPHONE ENCOUNTER
Prior Authorization Approval    Authorization Effective Date: 1/11/2022  Authorization Expiration Date: 1/11/2023  Medication: lantus-APPROVED  Approved Dose/Quantity:   Reference #:     Insurance Company: [employer] - Phone [phone] Fax [phone]  Expected CoPay:       CoPay Card Available:      Foundation Assistance Needed:    Which Pharmacy is filling the prescription (Not needed for infusion/clinic administered): [id number] [place] [address]  Pharmacy Notified: Yes  Patient Notified: No    Pharmacy will notify patient when medication is ready.

## 2022-02-17 ENCOUNTER — TELEPHONE (OUTPATIENT)
Dept: ENDOCRINOLOGY | Facility: CLINIC | Age: 37
End: 2022-02-17
Payer: COMMERCIAL

## 2022-02-17 DIAGNOSIS — E10.9 TYPE 1 DIABETES MELLITUS WITHOUT COMPLICATION (H): Primary | ICD-10-CM

## 2022-02-17 NOTE — TELEPHONE ENCOUNTER
[facility] Call Center    Phone Message    May a detailed message be left on voicemail: yes     Reason for Call: Medication Question or concern regarding medication   Prescription Clarification  Name of Medication:dexcom sensor and transmitters  Prescribing Provider: Bhavin     Pharmacy: [facility] [id number] [place] [address]   What on the order needs clarification? Pt stated he is still waiting on PA to be sent to insurance company-  They faxed on 22.      Pt stated he will also need lab orders for total cholesterol, LDL & HDL, glucose, triglycerides- he'll need BMI and BP as well for a form that he will bring to  appt.  Please place any other labs needed prior to appt- some orders have .           Action Taken: Message routed to:  Other: endo    Travel Screening: Not Applicable

## 2022-02-18 ASSESSMENT — ENCOUNTER SYMPTOMS
NAIL CHANGES: 0
SKIN CHANGES: 0
POOR WOUND HEALING: 0

## 2022-02-18 NOTE — TELEPHONE ENCOUNTER
Received fax from pharmacy stating patient requires Prior Authorization for Dexcom.     Insurance information:   Name:   Phone number:   ID number:     Initiate prior authorization or change medication? Initiate PA    If a prior authorization is to be initiated, please list the following:    -any medications the patient has tried and failed or any contraindications.  -is the patient currently on this medication, or has tried before?  -What is the diagnosis?  -Justification or other information that may be helpful.

## 2022-02-23 ENCOUNTER — TELEPHONE (OUTPATIENT)
Dept: ENDOCRINOLOGY | Facility: CLINIC | Age: 37
End: 2022-02-23

## 2022-02-23 ENCOUNTER — LAB (OUTPATIENT)
Dept: LAB | Facility: CLINIC | Age: 37
End: 2022-02-23
Payer: COMMERCIAL

## 2022-02-23 DIAGNOSIS — E10.9 TYPE 1 DIABETES MELLITUS WITHOUT COMPLICATION (H): ICD-10-CM

## 2022-02-23 LAB
ANION GAP SERPL CALCULATED.3IONS-SCNC: 4 MMOL/L (ref 3–14)
BUN SERPL-MCNC: 13 MG/DL (ref 7–30)
CALCIUM SERPL-MCNC: 8.8 MG/DL (ref 8.5–10.1)
CHLORIDE BLD-SCNC: 109 MMOL/L (ref 94–109)
CHOLEST SERPL-MCNC: 170 MG/DL
CO2 SERPL-SCNC: 29 MMOL/L (ref 20–32)
CREAT SERPL-MCNC: 1.01 MG/DL (ref 0.66–1.25)
CREAT UR-MCNC: 313 MG/DL
FASTING STATUS PATIENT QL REPORTED: YES
GFR SERPL CREATININE-BSD FRML MDRD: >90 ML/MIN/1.73M2
GLUCOSE BLD-MCNC: 49 MG/DL (ref 70–99)
HBA1C MFR BLD: 6.3 % (ref 0–5.6)
HDLC SERPL-MCNC: 63 MG/DL
LDLC SERPL CALC-MCNC: 95 MG/DL
MICROALBUMIN UR-MCNC: 8 MG/L
MICROALBUMIN/CREAT UR: 2.56 MG/G CR (ref 0–17)
NONHDLC SERPL-MCNC: 107 MG/DL
POTASSIUM BLD-SCNC: 4 MMOL/L (ref 3.4–5.3)
SODIUM SERPL-SCNC: 142 MMOL/L (ref 133–144)
TRIGL SERPL-MCNC: 59 MG/DL
TSH SERPL DL<=0.005 MIU/L-ACNC: 2.49 MU/L (ref 0.4–4)

## 2022-02-23 PROCEDURE — 82043 UR ALBUMIN QUANTITATIVE: CPT

## 2022-02-23 PROCEDURE — 84443 ASSAY THYROID STIM HORMONE: CPT

## 2022-02-23 PROCEDURE — 83036 HEMOGLOBIN GLYCOSYLATED A1C: CPT

## 2022-02-23 PROCEDURE — 36415 COLL VENOUS BLD VENIPUNCTURE: CPT

## 2022-02-23 PROCEDURE — 80048 BASIC METABOLIC PNL TOTAL CA: CPT

## 2022-02-23 PROCEDURE — 80061 LIPID PANEL: CPT

## 2022-02-23 NOTE — TELEPHONE ENCOUNTER
Per routing comment from Dr. Delcid:    Thank you.   I will discuss with him more at his appt tomorrow.   Will likely lower lantus dose after that discussion.     Nicolas Delcid MD on 2/23/2022 at 10:44 AM

## 2022-02-23 NOTE — TELEPHONE ENCOUNTER
Per routing comment from Dr. Delcdi:    Please have RN contact patient to ensure glucose is normal now and try to ascertain why he was low.     Nicolas Delcid MD on 2/23/2022 at 10:04 AM

## 2022-02-23 NOTE — TELEPHONE ENCOUNTER
Called patient and he was fasting this morning which is why his BG was low. It was 84 when he left his house to go to the lab and right now it is 223. He feels okay and does not have any concerns. Forwarded this update to Dr. Delcid.    Stepan LARA RN....2/23/2022 10:31 AM

## 2022-02-23 NOTE — TELEPHONE ENCOUNTER
Randi from the [place] lab called me, stated she had a critical lab for patient. His glucose is 49. Teams message sent to Dr. Delcid with this information and now this follow up TP encounter will be sent to him and the RN's.     Genevieve MALDONADO MA

## 2022-02-24 ENCOUNTER — OFFICE VISIT (OUTPATIENT)
Dept: ENDOCRINOLOGY | Facility: CLINIC | Age: 37
End: 2022-02-24
Payer: COMMERCIAL

## 2022-02-24 ENCOUNTER — TELEPHONE (OUTPATIENT)
Dept: ENDOCRINOLOGY | Facility: CLINIC | Age: 37
End: 2022-02-24

## 2022-02-24 VITALS
WEIGHT: 214 LBS | SYSTOLIC BLOOD PRESSURE: 117 MMHG | BODY MASS INDEX: 28.36 KG/M2 | HEART RATE: 52 BPM | HEIGHT: 73 IN | RESPIRATION RATE: 14 BRPM | DIASTOLIC BLOOD PRESSURE: 72 MMHG

## 2022-02-24 DIAGNOSIS — E10.9 TYPE 1 DIABETES MELLITUS WITHOUT COMPLICATION (H): Primary | ICD-10-CM

## 2022-02-24 PROCEDURE — 99214 OFFICE O/P EST MOD 30 MIN: CPT | Performed by: INTERNAL MEDICINE

## 2022-02-24 PROCEDURE — 95251 CONT GLUC MNTR ANALYSIS I&R: CPT | Performed by: INTERNAL MEDICINE
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
## 2022-02-24 NOTE — TELEPHONE ENCOUNTER
PRIOR AUTHORIZATION DENIED    Medication: dexcom sensor    Denial Date: 2/24/2022    Denial Rational:         Appeal Information:

## 2022-02-24 NOTE — NURSING NOTE
"Chief Complaint   Patient presents with     Follow Up     Diabetes       Initial /72 (BP Location: Left arm, Patient Position: Sitting, Cuff Size: Adult Large)   Pulse 52   Resp 14   Ht 1.854 m (6' 1\")   Wt 97.1 kg (214 lb)   BMI 28.23 kg/m   Estimated body mass index is 28.23 kg/m  as calculated from the following:    Height as of this encounter: 1.854 m (6' 1\").    Weight as of this encounter: 97.1 kg (214 lb).  BP completed using cuff size: large  Medications and allergies reviewed.      Genevieve MALDONADO MA    "

## 2022-02-24 NOTE — TELEPHONE ENCOUNTER
PRIOR AUTHORIZATION DENIED    Medication: DEXCOM TRANSMITTER    Denial Date: 2/24/2022    Denial Rational:       Appeal Information:

## 2022-02-24 NOTE — TELEPHONE ENCOUNTER
PA Initiation    Medication: dexcom sensor  Insurance Company:    Pharmacy Filling the Rx: [id number] [place] [facility] [place], [place] - [facility] [address] [facility]  Filling Pharmacy Phone: [phone]  Filling Pharmacy Fax:    Start Date: 2/24/2022

## 2022-02-24 NOTE — PATIENT INSTRUCTIONS
-Try giving meal time insulin 15-30 minutes prior to eating.   -If this does not reduce the day time lows, reduce lantus from 42 to 38 units.     -Consider switching to Lyumjev or FIASP in the future.     -Labs in 3 months.

## 2022-02-24 NOTE — TELEPHONE ENCOUNTER
PA Initiation    Medication: DEXCOM TRANSMITTER  Insurance Company:    Pharmacy Filling the Rx: [id number] [place], [place] - [facility] [address]   Filling Pharmacy Phone: [phone]  Filling Pharmacy Fax:    Start Date: 2/24/2022
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
## 2022-02-25 NOTE — TELEPHONE ENCOUNTER
Called patient and left VM, informed that Dexcom PA was denied. Dr. Delcid sent in a Rx for Rashaun. Advised to call with any questions.     Genevieve MALDONADO MA

## 2022-03-08 NOTE — TELEPHONE ENCOUNTER
RECORDS RECEIVED FROM: internal    DATE RECEIVED: 5.25.22    NOTES (FOR ALL VISITS) STATUS DETAILS   OFFICE NOTES from referring provider internal  Previous pt of Bhavin.    OFFICE NOTES from other specialist     ED NOTES     OPERATIVE REPORT  (thyroid, pituitary, adrenal, parathyroid)     MEDICATION LIST internal     IMAGING      DEXASCAN     MRI (BRAIN)     XR (Chest)     CT (HEAD/NECK/CHEST/ABDOMEN)     NUCLEAR      ULTRASOUND (HEAD/NECK)     LABS     DIABETES: HBGA1C, CREATININE, FASTING LIPIDS, MICROALBUMIN URINE, POTASSIUM, TSH, T4    THYROID: TSH, T4, CBC, THYRODLONULIN, TOTAL T3, FREE T4, CALCITONIN, CEA internal  Cbc 1.30.20  HBGA1C- 2.23.22  Lipid- 1.30.20  TSH/T4- 2.23.22

## 2022-03-13 ENCOUNTER — HOSPITAL ENCOUNTER (EMERGENCY)
Facility: CLINIC | Age: 37
Discharge: HOME | End: 2022-03-13
Attending: PHYSICIAN ASSISTANT | Admitting: PHYSICIAN ASSISTANT
Payer: COMMERCIAL

## 2022-03-13 VITALS
SYSTOLIC BLOOD PRESSURE: 137 MMHG | HEART RATE: 80 BPM | OXYGEN SATURATION: 95 % | RESPIRATION RATE: 18 BRPM | DIASTOLIC BLOOD PRESSURE: 93 MMHG | TEMPERATURE: 99.4 F | BODY MASS INDEX: 28.1 KG/M2 | HEIGHT: 73 IN | WEIGHT: 212 LBS

## 2022-03-13 DIAGNOSIS — S61.217A LACERATION OF LEFT LITTLE FINGER WITHOUT FOREIGN BODY WITHOUT DAMAGE TO NAIL, INITIAL ENCOUNTER: ICD-10-CM

## 2022-03-13 PROCEDURE — 12002 RPR S/N/AX/GEN/TRNK2.6-7.5CM: CPT | Performed by: PHYSICIAN ASSISTANT

## 2022-03-13 PROCEDURE — 250N000011 HC RX IP 250 OP 636: Performed by: PHYSICIAN ASSISTANT

## 2022-03-13 PROCEDURE — 90715 TDAP VACCINE 7 YRS/> IM: CPT | Performed by: PHYSICIAN ASSISTANT

## 2022-03-13 PROCEDURE — 99213 OFFICE O/P EST LOW 20 MIN: CPT | Mod: 25 | Performed by: PHYSICIAN ASSISTANT

## 2022-03-13 PROCEDURE — 90471 IMMUNIZATION ADMIN: CPT | Performed by: PHYSICIAN ASSISTANT

## 2022-03-13 PROCEDURE — G0463 HOSPITAL OUTPT CLINIC VISIT: HCPCS | Mod: 25 | Performed by: PHYSICIAN ASSISTANT

## 2022-03-13 RX ADMIN — CLOSTRIDIUM TETANI TOXOID ANTIGEN (FORMALDEHYDE INACTIVATED), CORYNEBACTERIUM DIPHTHERIAE TOXOID ANTIGEN (FORMALDEHYDE INACTIVATED), BORDETELLA PERTUSSIS TOXOID ANTIGEN (GLUTARALDEHYDE INACTIVATED), BORDETELLA PERTUSSIS FILAMENTOUS HEMAGGLUTININ ANTIGEN (FORMALDEHYDE INACTIVATED), BORDETELLA PERTUSSIS PERTACTIN ANTIGEN, AND BORDETELLA PERTUSSIS FIMBRIAE 2/3 ANTIGEN 0.5 ML: 5; 2; 2.5; 5; 3; 5 INJECTION, SUSPENSION INTRAMUSCULAR at 19:24
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
## 2022-05-17 NOTE — PROGRESS NOTES
Outcome for 05/17/22 3:23 PM: [employer] message sent  ELISSA Fried   Outcome for 05/23/22 11:48 AM: Rashaun emailed to provider  ELISSA Worthington

## 2022-05-24 ASSESSMENT — ENCOUNTER SYMPTOMS
NECK MASS: 0
TASTE DISTURBANCE: 0
SMELL DISTURBANCE: 0
SINUS PAIN: 1
SINUS CONGESTION: 1
SORE THROAT: 0
TROUBLE SWALLOWING: 0
HOARSE VOICE: 0

## 2022-05-25 ENCOUNTER — TELEPHONE (OUTPATIENT)
Dept: ENDOCRINOLOGY | Facility: CLINIC | Age: 37
End: 2022-05-25

## 2022-05-25 ENCOUNTER — PRE VISIT (OUTPATIENT)
Dept: ENDOCRINOLOGY | Facility: CLINIC | Age: 37
End: 2022-05-25

## 2022-05-25 ENCOUNTER — VIRTUAL VISIT (OUTPATIENT)
Dept: ENDOCRINOLOGY | Facility: CLINIC | Age: 37
End: 2022-05-25
Payer: COMMERCIAL

## 2022-05-25 DIAGNOSIS — E10.9 TYPE 1 DIABETES MELLITUS WITHOUT COMPLICATION (H): Primary | ICD-10-CM

## 2022-05-25 DIAGNOSIS — B36.9 FUNGAL SKIN INFECTION: ICD-10-CM

## 2022-05-25 PROCEDURE — 99215 OFFICE O/P EST HI 40 MIN: CPT | Mod: 95 | Performed by: INTERNAL MEDICINE

## 2022-05-25 RX ORDER — IBUPROFEN 600 MG/1
1 TABLET ORAL PRN
Qty: 1 KIT | Refills: 3 | Status: SHIPPED | OUTPATIENT
Start: 2022-05-25

## 2022-05-25 RX ORDER — FLUCONAZOLE 200 MG/1
200 TABLET ORAL DAILY
Qty: 30 TABLET | Refills: 0 | Status: SHIPPED | OUTPATIENT
Start: 2022-05-25 | End: 2023-04-05

## 2022-05-25 NOTE — NURSING NOTE
Medications/allergies reviewed during rooming process. Patient's pharmacy updated in chart. Patient reports that he is currently in the state of [place].  Patient is currently taking 44 units of Lantus.   Tati MALDONADO Virtual Visit Facilitator 8:43 AM May 25, 2022

## 2022-05-25 NOTE — PATIENT INSTRUCTIONS
Fabien,     Insulin Glargine 44 units daily.   1 unit for every 15 grams of carbohydrates with meals and snacks. Cover all carbohydrates. Take mealtime insulin 10 minutes before eating.   Please follow the following correction scale three times per day with meals and at bedtime.   -169 give 1 units.  -199 give 2 units.  -229 give 3 units.  -259 give 4 units.  -289 give 5 units.  -319 give 6 units.  -349 give 7 units.  BG >/= 350 give 8 units.

## 2022-05-25 NOTE — PROGRESS NOTES
Endocrinology Clinic Visit    Chief Complaint: Diabetes (Needs new provider- diabetes follow up.)     Information obtained from:Patient      Assessment/Treatment Plan:      Type 1 diabetes without complications  Blood glucose readings or adalberto reviewed in detail.  Has had multiple postprandial hyperglycemia.  Currently Lantus 44 units daily and mealtime insulin 1 for every 15 g above 15 g of carbohydrates.  If less than 15 g of carbohydrate usually does not cover it.  The proportion of basal compared to bolus is very high.  In the long run, reducing basal and increasing bolus insulin with meals seems reasonable to have a more flatter blood glucose pattern.  Closed-loop insulin pump system would be a great addition; however Fabien doesn't want to consider insulin pump system at this time.    Plan:    Insulin Glargine 44 units daily.     1 unit for every 15 grams of carbohydrates with meals and snacks. Cover all carbohydrates. Take mealtime insulin 10 minutes before eating.     Please follow the following correction scale three times per day with meals and at bedtime.   -169 give 1 units.  -199 give 2 units.  -229 give 3 units.  -259 give 4 units.  -289 give 5 units.  -319 give 6 units.  -349 give 7 units.  BG >/= 350 give 8 units.    No microalbuminuria.  Glucagon prescription sent to pharmacy.  History of significant fungal infection of the foot; requesting refill for fluconazole which has helped that in the past.  Refill provided; liver toxicity of the medication advised.    Return to clinic in 5-6 months.        Mariano Martinez MD  Staff Endocrinologist    Division of Endocrinology and Diabetes      Subjective:         HPI: Faraz Horowitz is a 36 year old male with history of type 1 diabetes who is here for routine follow-up.  Used to follow-up with Nicolas Monae.    Type 1 diabetes diagnosed at the age of 17.  Currently on insulin glargine 44 units  daily  Short-acting insulin 1 unit for every 15 g of carbohydrates above 15 g of carbohydrates.  Correction scale usually given when the blood sugar is in the 200s.    Walking with dog 2-3 times per day- walks an hour   Works in [place] - March - April stressful time.                                              Answers for HPI/ROS submitted by the patient on 5/24/2022  General Symptoms: No  Skin Symptoms: No  HENT Symptoms: Yes  EYE SYMPTOMS: No  HEART SYMPTOMS: No  LUNG SYMPTOMS: No  INTESTINAL SYMPTOMS: No  URINARY SYMPTOMS: No  REPRODUCTIVE SYMPTOMS: No  SKELETAL SYMPTOMS: No  BLOOD SYMPTOMS: No  NERVOUS SYSTEM SYMPTOMS: No  MENTAL HEALTH SYMPTOMS: No  Ear pain: No  Ear discharge: No  Hearing loss: No  Tinnitus: No  Nosebleeds: No  Congestion: Yes  Sinus pain: Yes  Trouble swallowing: No   Voice hoarseness: No  Mouth sores: No  Sore throat: No  Tooth pain: No  Gum tenderness: No  Bleeding gums: No  Change in taste: No  Change in sense of smell: No  Dry mouth: No  Hearing aid used: No  Neck lump: No      Allergies   Allergen Reactions     Augmentin Nausea and Vomiting     Doxycycline Other (See Comments)     Elevated blood sugar       Current Outpatient Medications   Medication Sig Dispense Refill     fluconazole (DIFLUCAN) 200 MG tablet Take 1 tablet (200 mg) by mouth daily 30 tablet 0     Glucagon, rDNA, (GLUCAGON EMERGENCY) 1 MG KIT Inject 1 mg into the muscle as needed (Severe hypoglycemia) 1 kit 3     insulin aspart (NOVOLOG FLEXPEN) 100 UNIT/ML pen Up to 50 units daily. 45 mL 3     insulin glargine (LANTUS PEN) 100 UNIT/ML pen Inject 44 Units Subcutaneous At Bedtime 60 mL 3     insulin lispro (HUMALOG KWIKPEN) 100 UNIT/ML (1 unit dial) KWIKPEN Up to 50 units a day. 60 mL 3     blood glucose (ONETOUCH ULTRA) test strip 1 each (Patient not taking: No sig reported)       Continuous Blood Gluc  (DEXCOM G6 ) GREGOR 1 each daily 1 each 0     Continuous Blood Gluc  (FREESTYLE PEG 2 READER)  GREGOR 1 each daily 1 each 0     Continuous Blood Gluc Sensor (DEXCOM G6 SENSOR) MISC 1 each every 10 days 9 each 3     Continuous Blood Gluc Sensor (FREESTYLE PEG 2 SENSOR) MISC 1 each every 14 days 6 each 3     Continuous Blood Gluc Transmit (DEXCOM G6 TRANSMITTER) MISC 1 each every 3 months 2 each 1     LANTUS SOLOSTAR 100 UNIT/ML soln INJECT 50-55 UNITS UNDER THE SKIN AT BEDTIME. (Patient not taking: Reported on 5/25/2022) 60 mL 1       Review of Systems     11 point review system (Constitutional, HENT, Eyes, Respiratory, Cardiovascular, Gastrointestinal, Genitourinary, Musculoskeletal,Neurological, Psychiatric/Behavioural, Endocrine) is negative or is as per HPI above        Past Surgical History:   Procedure Laterality Date     INSERT INTRACORONARY STENT N/A 3/20/2018    Procedure: EXCISION OF LEFT ARM LIPOMA/ RIGHT ARM LIPOMAS X3 LEFT THIGH AND RIGHT THIGH ;  Surgeon: Jeff Bonilla DO;  Location: [facility];  Service:      MOUTH SURGERY         Family History   Problem Relation Age of Onset     Diabetes Father      Diabetes Maternal Grandmother      Prostate Cancer Paternal Grandmother      Heart Disease Father         heart attacks     Hypertension Father      Hearing Loss Maternal Grandmother      Dementia Maternal Grandmother      Arthritis Paternal Grandmother      Prostate Cancer Paternal Grandfather        Objective:   GENERAL: alert and no distress  EYES: Eyes grossly normal to inspection.  No discharge or erythema, or obvious scleral/conjunctival abnormalities.  RESP: No audible wheeze, cough, or visible cyanosis.  No visible retractions or increased work of breathing.    SKIN: Visible skin clear. No significant rash, abnormal pigmentation or lesions.  NEURO: Cranial nerves grossly intact.  Mentation and speech appropriate for age.  PSYCH: Mentation appears normal, affect normal/bright, judgement and insight intact, normal speech.    In House Labs:   Lab Results   Component Value Date    A1C  6.3 02/23/2022    A1C 6.3 10/11/2021    A1C 6.2 01/05/2021    A1C 7.8 07/23/2020    A1C 7.1 01/30/2020    A1C 6.7 09/12/2019    A1C 6.5 12/11/2018       TSH   Date Value Ref Range Status   02/23/2022 2.49 0.40 - 4.00 mU/L Final   01/05/2021 1.66 0.40 - 4.00 mU/L Final   07/23/2020 2.33 0.40 - 4.00 mU/L Final       Creatinine   Date Value Ref Range Status   02/23/2022 1.01 0.66 - 1.25 mg/dL Final   01/05/2021 1.09 0.66 - 1.25 mg/dL Final   ]    Recent Labs   Lab Test 02/23/22  0720 01/05/21  0907   CHOL 170 188   HDL 63 70   LDL 95 105*   TRIG 59 66       This note has been dictated using voice recognition software.  As a result, there may be errors in the documentation that have gone undetected.  Please consider this when interpreting information in this documentation.      Video time= 25 minutes.   Amwell.   47 minutes spent on the date of the encounter doing chart review, history, documentation and further activities per the note.

## 2022-05-25 NOTE — LETTER
5/25/2022       RE: Faraz Horowitz  [address]  [facility] [address]     Dear Colleague,    Thank you for referring your patient, Faraz Horowitz, to the [facility] [place] [facility]. Please see a copy of my visit note below.    Outcome for 05/17/22 3:23 PM: Neura message sent  ELISSA Fried   Outcome for 05/23/22 11:48 AM: Rashaun emailed to provider  ELISSA Worthington            Fabien is a 36 year old who is being evaluated via a billable video visit.  Patient reports that he is currently in [place].      How would you like to obtain your AVS? Shutl  If the video visit is dropped, the invitation should be resent by: Text to cell phone: [phone]  Will anyone else be joining your video visit? No        Endocrinology Clinic Visit    Chief Complaint: Diabetes (Needs new provider- diabetes follow up.)     Information obtained from:Patient      Assessment/Treatment Plan:      Type 1 diabetes without complications  Blood glucose readings or rashaun reviewed in detail.  Has had multiple postprandial hyperglycemia.  Currently Lantus 44 units daily and mealtime insulin 1 for every 15 g above 15 g of carbohydrates.  If less than 15 g of carbohydrate usually does not cover it.  The proportion of basal compared to bolus is very high.  In the long run, reducing basal and increasing bolus insulin with meals seems reasonable to have a more flatter blood glucose pattern.  Closed-loop insulin pump system would be a great addition; however Fabien doesn't want to consider insulin pump system at this time.    Plan:    Insulin Glargine 44 units daily.     1 unit for every 15 grams of carbohydrates with meals and snacks. Cover all carbohydrates. Take mealtime insulin 10 minutes before eating.     Please follow the following correction scale three times per day with meals and at bedtime.   -169 give 1 units.  -199 give 2 units.  BG  200-229 give 3 units.  -259 give 4 units.  -289 give 5 units.  -319 give 6 units.  -349 give 7 units.  BG >/= 350 give 8 units.    No microalbuminuria.  Glucagon prescription sent to pharmacy.  History of significant fungal infection of the foot; requesting refill for fluconazole which has helped that in the past.  Refill provided; liver toxicity of the medication advised.    Return to clinic in 5-6 months.        Mariano Martinez MD  Staff Endocrinologist    Division of Endocrinology and Diabetes      Subjective:         HPI: Faraz Horowitz is a 36 year old male with history of type 1 diabetes who is here for routine follow-up.  Used to follow-up with Nicolas Bensons.    Type 1 diabetes diagnosed at the age of 17.  Currently on insulin glargine 44 units daily  Short-acting insulin 1 unit for every 15 g of carbohydrates above 15 g of carbohydrates.  Correction scale usually given when the blood sugar is in the 200s.    Walking with dog 2-3 times per day- walks an hour   Works in [employer] - March - April stressful time.                                              Answers for HPI/ROS submitted by the patient on 5/24/2022  General Symptoms: No  Skin Symptoms: No  HENT Symptoms: Yes  EYE SYMPTOMS: No  HEART SYMPTOMS: No  LUNG SYMPTOMS: No  INTESTINAL SYMPTOMS: No  URINARY SYMPTOMS: No  REPRODUCTIVE SYMPTOMS: No  SKELETAL SYMPTOMS: No  BLOOD SYMPTOMS: No  NERVOUS SYSTEM SYMPTOMS: No  MENTAL HEALTH SYMPTOMS: No  Ear pain: No  Ear discharge: No  Hearing loss: No  Tinnitus: No  Nosebleeds: No  Congestion: Yes  Sinus pain: Yes  Trouble swallowing: No   Voice hoarseness: No  Mouth sores: No  Sore throat: No  Tooth pain: No  Gum tenderness: No  Bleeding gums: No  Change in taste: No  Change in sense of smell: No  Dry mouth: No  Hearing aid used: No  Neck lump: No      Allergies   Allergen Reactions     Augmentin Nausea and Vomiting     Doxycycline Other (See Comments)     Elevated blood  sugar       Current Outpatient Medications   Medication Sig Dispense Refill     fluconazole (DIFLUCAN) 200 MG tablet Take 1 tablet (200 mg) by mouth daily 30 tablet 0     Glucagon, rDNA, (GLUCAGON EMERGENCY) 1 MG KIT Inject 1 mg into the muscle as needed (Severe hypoglycemia) 1 kit 3     insulin aspart (NOVOLOG FLEXPEN) 100 UNIT/ML pen Up to 50 units daily. 45 mL 3     insulin glargine (LANTUS PEN) 100 UNIT/ML pen Inject 44 Units Subcutaneous At Bedtime 60 mL 3     insulin lispro (HUMALOG KWIKPEN) 100 UNIT/ML (1 unit dial) KWIKPEN Up to 50 units a day. 60 mL 3     blood glucose (ONETOUCH ULTRA) test strip 1 each (Patient not taking: No sig reported)       Continuous Blood Gluc  (DEXCOM G6 ) GREGOR 1 each daily 1 each 0     Continuous Blood Gluc  (FREESTYLE PEG 2 READER) GREGOR 1 each daily 1 each 0     Continuous Blood Gluc Sensor (DEXCOM G6 SENSOR) MISC 1 each every 10 days 9 each 3     Continuous Blood Gluc Sensor (FREESTYLE PEG 2 SENSOR) MISC 1 each every 14 days 6 each 3     Continuous Blood Gluc Transmit (DEXCOM G6 TRANSMITTER) MISC 1 each every 3 months 2 each 1     LANTUS SOLOSTAR 100 UNIT/ML soln INJECT 50-55 UNITS UNDER THE SKIN AT BEDTIME. (Patient not taking: Reported on 5/25/2022) 60 mL 1       Review of Systems     11 point review system (Constitutional, HENT, Eyes, Respiratory, Cardiovascular, Gastrointestinal, Genitourinary, Musculoskeletal,Neurological, Psychiatric/Behavioural, Endocrine) is negative or is as per HPI above        Past Surgical History:   Procedure Laterality Date     INSERT INTRACORONARY STENT N/A 3/20/2018    Procedure: EXCISION OF LEFT ARM LIPOMA/ RIGHT ARM LIPOMAS X3 LEFT THIGH AND RIGHT THIGH ;  Surgeon: Jeff Bonilla DO;  Location: [facility];  Service:      MOUTH SURGERY         Family History   Problem Relation Age of Onset     Diabetes Father      Diabetes Maternal Grandmother      Prostate Cancer Paternal Grandmother      Heart Disease  Father         heart attacks     Hypertension Father      Hearing Loss Maternal Grandmother      Dementia Maternal Grandmother      Arthritis Paternal Grandmother      Prostate Cancer Paternal Grandfather        Objective:   GENERAL: alert and no distress  EYES: Eyes grossly normal to inspection.  No discharge or erythema, or obvious scleral/conjunctival abnormalities.  RESP: No audible wheeze, cough, or visible cyanosis.  No visible retractions or increased work of breathing.    SKIN: Visible skin clear. No significant rash, abnormal pigmentation or lesions.  NEURO: Cranial nerves grossly intact.  Mentation and speech appropriate for age.  PSYCH: Mentation appears normal, affect normal/bright, judgement and insight intact, normal speech.    In House Labs:   Lab Results   Component Value Date    A1C 6.3 02/23/2022    A1C 6.3 10/11/2021    A1C 6.2 01/05/2021    A1C 7.8 07/23/2020    A1C 7.1 01/30/2020    A1C 6.7 09/12/2019    A1C 6.5 12/11/2018       TSH   Date Value Ref Range Status   02/23/2022 2.49 0.40 - 4.00 mU/L Final   01/05/2021 1.66 0.40 - 4.00 mU/L Final   07/23/2020 2.33 0.40 - 4.00 mU/L Final       Creatinine   Date Value Ref Range Status   02/23/2022 1.01 0.66 - 1.25 mg/dL Final   01/05/2021 1.09 0.66 - 1.25 mg/dL Final   ]    Recent Labs   Lab Test 02/23/22  0720 01/05/21  0907   CHOL 170 188   HDL 63 70   LDL 95 105*   TRIG 59 66       This note has been dictated using voice recognition software.  As a result, there may be errors in the documentation that have gone undetected.  Please consider this when interpreting information in this documentation.      Video time= 25 minutes.   Amwell.   47 minutes spent on the date of the encounter doing chart review, history, documentation and further activities per the note.

## 2022-05-25 NOTE — PROGRESS NOTES
Fabien is a 36 year old who is being evaluated via a billable video visit.  Patient reports that he is currently in [place].      How would you like to obtain your AVS? MyChart  If the video visit is dropped, the invitation should be resent by: Text to cell phone: [phone]  Will anyone else be joining your video visit? No

## 2022-05-25 NOTE — TELEPHONE ENCOUNTER
"\"Return in about 5 months (around 10/25/2022) for in person.\"    Writer tried to contact patient in regards to the scheduling note above per Dr. Martinez, but was unable to reach the patient. Writer left a generic message for patient to call back. Scheduling number given in voicemail. If patient calls back, please assist in scheduling.    Tati MALDONADO Virtual Visit Facilitator 10:38 AM May 25, 2022        "

## 2022-10-22 ENCOUNTER — HEALTH MAINTENANCE LETTER (OUTPATIENT)
Age: 37
End: 2022-10-22

## 2022-11-05 DIAGNOSIS — E10.9 TYPE 1 DIABETES MELLITUS WITHOUT COMPLICATIONS (H): ICD-10-CM

## 2022-11-07 RX ORDER — INSULIN GLARGINE 100 [IU]/ML
INJECTION, SOLUTION SUBCUTANEOUS
Qty: 45 ML | Refills: 1 | Status: SHIPPED | OUTPATIENT
Start: 2022-11-07 | End: 2022-12-13

## 2022-11-07 NOTE — TELEPHONE ENCOUNTER
Long Acting Insulin Protocol Failed 11/07/2022 07:25 AM   Protocol Details  HgbA1C in past 3 or 6 months     LCV 05/25/22 with Dr Martinez  A1c order in place

## 2022-11-07 NOTE — TELEPHONE ENCOUNTER
insulin glargine (LANTUS PEN) 100 UNIT/ML pen        Last Written Prescription Date:  12/13/21  Last Fill Quantity: 60mL,   # refills: 3  Last Office Visit : 05/25/22  Future Office visit:  None scheduled    Routing refill request to provider for review/approval because:  Insulin - refilled per clinic

## 2022-11-28 DIAGNOSIS — E10.9 TYPE 1 DIABETES MELLITUS WITHOUT COMPLICATION (H): ICD-10-CM

## 2022-12-12 ASSESSMENT — ENCOUNTER SYMPTOMS
EYE PAIN: 0
SORE THROAT: 0
FREQUENCY: 0
HEMATOCHEZIA: 0
HEARTBURN: 0
MYALGIAS: 0
WEAKNESS: 0
PALPITATIONS: 0
DIZZINESS: 0
HEMATURIA: 0
DIARRHEA: 0
COUGH: 0
ARTHRALGIAS: 0
NAUSEA: 0
DYSURIA: 0
JOINT SWELLING: 0
PARESTHESIAS: 0
NERVOUS/ANXIOUS: 0
CHILLS: 0
ABDOMINAL PAIN: 0
FEVER: 0
SHORTNESS OF BREATH: 0
CONSTIPATION: 0
HEADACHES: 0

## 2022-12-13 ENCOUNTER — OFFICE VISIT (OUTPATIENT)
Dept: FAMILY MEDICINE | Facility: CLINIC | Age: 37
End: 2022-12-13
Payer: COMMERCIAL

## 2022-12-13 VITALS
SYSTOLIC BLOOD PRESSURE: 118 MMHG | BODY MASS INDEX: 32 KG/M2 | HEART RATE: 64 BPM | HEIGHT: 71 IN | WEIGHT: 228.6 LBS | OXYGEN SATURATION: 97 % | DIASTOLIC BLOOD PRESSURE: 78 MMHG | RESPIRATION RATE: 16 BRPM

## 2022-12-13 DIAGNOSIS — Z00.00 ROUTINE GENERAL MEDICAL EXAMINATION AT A HEALTH CARE FACILITY: Primary | ICD-10-CM

## 2022-12-13 DIAGNOSIS — E10.9 TYPE 1 DIABETES MELLITUS WITHOUT COMPLICATION (H): ICD-10-CM

## 2022-12-13 PROBLEM — D17.9 MULTIPLE LIPOMAS: Status: ACTIVE | Noted: 2018-02-06

## 2022-12-13 LAB
CHOLEST SERPL-MCNC: 199 MG/DL
FASTING STATUS PATIENT QL REPORTED: YES
GLUCOSE SERPL-MCNC: 71 MG/DL (ref 70–99)
HBA1C MFR BLD: 6.6 % (ref 0–5.6)
HDLC SERPL-MCNC: 65 MG/DL
LDLC SERPL CALC-MCNC: 115 MG/DL
NONHDLC SERPL-MCNC: 134 MG/DL
TRIGL SERPL-MCNC: 96 MG/DL

## 2022-12-13 PROCEDURE — 99395 PREV VISIT EST AGE 18-39: CPT | Performed by: NURSE PRACTITIONER

## 2022-12-13 PROCEDURE — 80061 LIPID PANEL: CPT | Performed by: NURSE PRACTITIONER

## 2022-12-13 PROCEDURE — 83036 HEMOGLOBIN GLYCOSYLATED A1C: CPT | Performed by: NURSE PRACTITIONER

## 2022-12-13 PROCEDURE — 36415 COLL VENOUS BLD VENIPUNCTURE: CPT | Performed by: NURSE PRACTITIONER

## 2022-12-13 PROCEDURE — 99213 OFFICE O/P EST LOW 20 MIN: CPT | Mod: 25 | Performed by: NURSE PRACTITIONER

## 2022-12-13 PROCEDURE — 82947 ASSAY GLUCOSE BLOOD QUANT: CPT | Performed by: NURSE PRACTITIONER

## 2022-12-13 RX ORDER — DOXYCYCLINE HYCLATE 100 MG
TABLET ORAL
COMMUNITY
Start: 2022-01-05 | End: 2022-12-13

## 2022-12-13 RX ORDER — FLUTICASONE PROPIONATE 50 MCG
SPRAY, SUSPENSION (ML) NASAL
COMMUNITY
Start: 2022-01-05

## 2022-12-13 RX ORDER — INSULIN GLARGINE 100 [IU]/ML
INJECTION, SOLUTION SUBCUTANEOUS
COMMUNITY
Start: 2021-01-28 | End: 2022-12-13 | Stop reason: DRUGHIGH

## 2022-12-13 ASSESSMENT — ENCOUNTER SYMPTOMS
MYALGIAS: 0
DYSURIA: 0
SORE THROAT: 0
EYE PAIN: 0
COUGH: 0
HEMATOCHEZIA: 0
NERVOUS/ANXIOUS: 0
WEAKNESS: 0
PALPITATIONS: 0
CHILLS: 0
PARESTHESIAS: 0
HEARTBURN: 0
FREQUENCY: 0
NAUSEA: 0
ARTHRALGIAS: 0
DIZZINESS: 0
FEVER: 0
JOINT SWELLING: 0
HEMATURIA: 0
HEADACHES: 0
SHORTNESS OF BREATH: 0
CONSTIPATION: 0
ABDOMINAL PAIN: 0
DIARRHEA: 0

## 2022-12-13 ASSESSMENT — LIFESTYLE VARIABLES
HOW OFTEN DO YOU HAVE SIX OR MORE DRINKS ON ONE OCCASION: NEVER
HOW OFTEN DO YOU HAVE A DRINK CONTAINING ALCOHOL: 4 OR MORE TIMES A WEEK
SKIP TO QUESTIONS 9-10: 1
AUDIT-C TOTAL SCORE: 4
HOW MANY STANDARD DRINKS CONTAINING ALCOHOL DO YOU HAVE ON A TYPICAL DAY: 1 OR 2

## 2022-12-13 ASSESSMENT — PAIN SCALES - GENERAL: PAINLEVEL: NO PAIN (0)

## 2022-12-13 NOTE — PROGRESS NOTES
SUBJECTIVE:   CC: Fabien is an 37 year old who presents for preventative health visit.     Patient has been advised of split billing requirements and indicates understanding: Yes     Healthy Habits:     Getting at least 3 servings of Calcium per day:  Yes    Bi-annual eye exam:  Yes    Dental care twice a year:  Yes    Sleep apnea or symptoms of sleep apnea:  Excessive snoring    Diet:  Carbohydrate counting    Frequency of exercise:  6-7 days/week    Duration of exercise:  45-60 minutes    Taking medications regularly:  Yes    Medication side effects:  Not applicable    PHQ-2 Total Score: 0    Additional concerns today:  Yes    Today's PHQ-2 Score:   PHQ-2 ( 1999 Pfizer) 12/12/2022   Q1: Little interest or pleasure in doing things 0   Q2: Feeling down, depressed or hopeless 0   PHQ-2 Score 0   Q1: Little interest or pleasure in doing things Not at all   Q2: Feeling down, depressed or hopeless Not at all   PHQ-2 Score 0   Patient declined vaccines today .      Have you ever done Advance Care Planning? (For example, a Health Directive, POLST, or a discussion with a medical provider or your loved ones about your wishes): Yes, patient states has an Advance Care Planning document and will bring a copy to the clinic.    Social History     Tobacco Use     Smoking status: Never     Smokeless tobacco: Current     Types: Chew     Tobacco comments:     Working on quitting; substituting non nicotine chew   Substance Use Topics     Alcohol use: Yes     Alcohol/week: 10.0 standard drinks     Types: 10 Shots of liquor per week     If you drink alcohol do you typically have >3 drinks per day or >7 drinks per week? No    Alcohol Use 12/13/2022   Prescreen: >3 drinks/day or >7 drinks/week? -   Prescreen: >3 drinks/day or >7 drinks/week? No   AUDIT SCORE  -       Last PSA: No results found for: PSA    Reviewed orders with patient. Reviewed health maintenance and updated orders accordingly - Yes  Lab work is in process  Labs reviewed in  EPIC  BP Readings from Last 3 Encounters:   12/13/22 118/78   03/13/22 (!) 137/93   02/24/22 117/72    Wt Readings from Last 3 Encounters:   12/13/22 103.7 kg (228 lb 9.6 oz)   03/13/22 96.2 kg (212 lb)   02/24/22 97.1 kg (214 lb)                  Patient Active Problem List   Diagnosis     Type 1 diabetes mellitus without complication (H)     Multiple lipomas     Vitamin D deficiency disease     Past Surgical History:   Procedure Laterality Date     INSERT INTRACORONARY STENT N/A 3/20/2018    Procedure: EXCISION OF LEFT ARM LIPOMA/ RIGHT ARM LIPOMAS X3 LEFT THIGH AND RIGHT THIGH ;  Surgeon: Jeff Bonilla DO;  Location: [facility];  Service:      MOUTH SURGERY         Social History     Tobacco Use     Smoking status: Never     Smokeless tobacco: Current     Types: Chew     Tobacco comments:     Working on quitting; substituting non nicotine chew   Substance Use Topics     Alcohol use: Yes     Alcohol/week: 10.0 standard drinks     Types: 10 Shots of liquor per week     Family History   Problem Relation Age of Onset     Diabetes Father      Heart Disease Father         heart attacks     Hypertension Father      Diabetes Maternal Grandmother      Hearing Loss Maternal Grandmother      Dementia Maternal Grandmother      No Known Problems Maternal Grandfather      Arthritis Paternal Grandmother      Prostate Cancer Paternal Grandfather          Current Outpatient Medications   Medication Sig Dispense Refill     Continuous Blood Gluc  (FREESTYLE PEG 2 READER) GREGOR 1 each daily 1 each 0     Continuous Blood Gluc Sensor (FREESTYLE PEG 2 SENSOR) MISC 1 each every 14 days 6 each 3     Glucagon, rDNA, (GLUCAGON EMERGENCY) 1 MG KIT Inject 1 mg into the muscle as needed (Severe hypoglycemia) 1 kit 3     insulin aspart (NOVOLOG FLEXPEN) 100 UNIT/ML pen Up to 50 units daily. 45 mL 3     insulin glargine (LANTUS PEN) 100 UNIT/ML pen Inject 44 Units Subcutaneous At Bedtime (Patient taking differently: Inject  44 Units Subcutaneous every morning) 60 mL 3     blood glucose (ONETOUCH ULTRA) test strip 1 each (Patient not taking: Reported on 10/11/2021)       fluconazole (DIFLUCAN) 200 MG tablet Take 1 tablet (200 mg) by mouth daily (Patient not taking: Reported on 12/13/2022) 30 tablet 0     fluticasone (FLONASE) 50 MCG/ACT nasal spray USE 2 SPRAYS INTRANASALLY ONCE A DAY FOR 7 DAYS (Patient not taking: Reported on 12/13/2022)       Allergies   Allergen Reactions     Augmentin Nausea and Vomiting       Reviewed and updated as needed this visit by clinical staff   Tobacco  Allergies  Meds  Problems  Med Hx  Surg Hx  Fam Hx  Soc   Hx        Reviewed and updated as needed this visit by Provider   Tobacco  Allergies  Meds  Problems  Med Hx  Surg Hx  Fam Hx  Soc   Hx       Past Medical History:   Diagnosis Date     Achilles rupture, left      Acquired deflected nasal septum      Diabetes (H)      Diabetes mellitus (H)      Hypertrophy of both inferior nasal turbinates       Past Surgical History:   Procedure Laterality Date     INSERT INTRACORONARY STENT N/A 3/20/2018    Procedure: EXCISION OF LEFT ARM LIPOMA/ RIGHT ARM LIPOMAS X3 LEFT THIGH AND RIGHT THIGH ;  Surgeon: Jeff Bonilla DO;  Location: [facility];  Service:      MOUTH SURGERY         Review of Systems   Constitutional: Negative for chills and fever.   HENT: Positive for congestion. Negative for ear pain, hearing loss and sore throat.    Eyes: Negative for pain and visual disturbance.   Respiratory: Negative for cough and shortness of breath.    Cardiovascular: Negative for chest pain, palpitations and peripheral edema.   Gastrointestinal: Negative for abdominal pain, constipation, diarrhea, heartburn, hematochezia and nausea.   Genitourinary: Negative for dysuria, frequency, genital sores, hematuria, impotence, penile discharge and urgency.   Musculoskeletal: Negative for arthralgias, joint swelling and myalgias.   Skin: Negative for rash.  "  Neurological: Negative for dizziness, weakness, headaches and paresthesias.   Psychiatric/Behavioral: Negative for mood changes. The patient is not nervous/anxious.      OBJECTIVE:   /78   Pulse 64   Resp 16   Ht 1.812 m (5' 11.34\")   Wt 103.7 kg (228 lb 9.6 oz)   SpO2 97%   BMI 31.58 kg/m      Physical Exam  GENERAL: healthy, alert and no distress  EYES: Eyes grossly normal to inspection, PERRL and conjunctivae and sclerae normal  HENT: ear canals and TM's normal, nose and mouth without ulcers or lesions  NECK: no adenopathy, no asymmetry, masses, or scars and thyroid normal to palpation  RESP: lungs clear to auscultation - no rales, rhonchi or wheezes  CV: regular rate and rhythm, normal S1 S2, no S3 or S4, no murmur, click or rub, no peripheral edema and peripheral pulses strong  ABDOMEN: soft, nontender, no hepatosplenomegaly, no masses and bowel sounds normal  MS: no gross musculoskeletal defects noted, no edema  SKIN: no suspicious lesions or rashes  NEURO: Normal strength and tone, mentation intact and speech normal  BACK: no CVA tenderness, no paralumbar tenderness  PSYCH: mentation appears normal, affect normal/bright  LYMPH: no cervical, supraclavicular, axillary, or inguinal adenopathy  Diabetic foot exam: normal DP and PT pulses, no trophic changes or ulcerative lesions and normal sensory exam    Diagnostic Test Results:  Labs reviewed in Epic    ASSESSMENT/PLAN:   (Z00.00) Routine general medical examination at a health care facility  (primary encounter diagnosis)  Comment: No concerns on history or exam today.  Lipid and glucose ordered for biometric screening for his work.  He declines all medications and will check with insurance and Hep C and HIV screening and complete next year.  Handout given on preventative health care.  Follow-up in 1 year.  Plan: REVIEW OF HEALTH MAINTENANCE PROTOCOL ORDERS,         Lipid panel reflex to direct LDL Fasting    (E10.9) Type 1 diabetes mellitus " "without complication (H)  Comment: A1C completed today and foot exam which was normal.  Reviewed medications and cleaned up his list.  No concerns with diabetes today.  I will be in touch with A1C result.  He will follow-up with endocrinology in 6 months and then with me again in 1 year.  Plan: HEMOGLOBIN A1C, insulin glargine (LANTUS PEN)         100 UNIT/ML pen, Glucose    Patient has been advised of split billing requirements and indicates understanding: Yes    COUNSELING:   Reviewed preventive health counseling, as reflected in patient instructions       Regular exercise       Healthy diet/nutrition       Vision screening       Immunizations    Declined: Covid-19, Influenza and Pneumococcal due to Conscientious objector           Consider Hep C screening for all patients one time for ages 18-79 years       HIV screeninx in teen years, 1x in adult years, and at intervals if high risk       Advance Care Planning      BMI:   Estimated body mass index is 31.58 kg/m  as calculated from the following:    Height as of this encounter: 1.812 m (5' 11.34\").    Weight as of this encounter: 103.7 kg (228 lb 9.6 oz).   Weight management plan: Discussed healthy diet and exercise guidelines      He reports that he has never smoked. His smokeless tobacco use includes chew.    Yanira Cornejo NP  [facility]  "

## 2022-12-13 NOTE — PATIENT INSTRUCTIONS
Try Cetirizine 10 mg at bedtime daily for the congestion.  Check insurance for Hep C and HIV screening     Preventive Health Recommendations  Male Ages 26 - 39    Yearly exam:             See your health care provider every year in order to  o   Review health changes.   o   Discuss preventive care.    o   Review your medicines if your doctor has prescribed any.  You should be tested each year for STDs (sexually transmitted diseases), if you re at risk.   After age 35, talk to your provider about cholesterol testing. If you are at risk for heart disease, have your cholesterol tested at least every 5 years.   If you are at risk for diabetes, you should have a diabetes test (fasting glucose).  Shots: Get a flu shot each year. Get a tetanus shot every 10 years.     Nutrition:  Eat at least 5 servings of fruits and vegetables daily.   Eat whole-grain bread, whole-wheat pasta and brown rice instead of white grains and rice.   Get adequate Calcium and Vitamin D.     Lifestyle  Exercise for at least 150 minutes a week (30 minutes a day, 5 days a week). This will help you control your weight and prevent disease.   Limit alcohol to one drink per day.   No smoking.   Wear sunscreen to prevent skin cancer.   See your dentist every six months for an exam and cleaning.

## 2023-01-13 NOTE — TELEPHONE ENCOUNTER
Appointment reminder card mailed for patient to contact office to schedule a physical Rx for novolog sent.   Nicolas Delcid MD

## 2023-02-07 ENCOUNTER — TELEPHONE (OUTPATIENT)
Dept: ENDOCRINOLOGY | Facility: CLINIC | Age: 38
End: 2023-02-07
Payer: COMMERCIAL

## 2023-02-07 DIAGNOSIS — E10.9 TYPE 1 DIABETES MELLITUS WITHOUT COMPLICATION (H): Primary | ICD-10-CM

## 2023-02-08 RX ORDER — INSULIN GLARGINE 100 [IU]/ML
INJECTION, SOLUTION SUBCUTANEOUS
Qty: 45 ML | Refills: 0 | Status: SHIPPED | OUTPATIENT
Start: 2023-02-08 | End: 2023-03-03 | Stop reason: ALTCHOICE

## 2023-02-22 DIAGNOSIS — E10.9 TYPE 1 DIABETES MELLITUS WITHOUT COMPLICATION (H): ICD-10-CM

## 2023-02-22 RX ORDER — INSULIN ASPART 100 [IU]/ML
INJECTION, SOLUTION INTRAVENOUS; SUBCUTANEOUS
Qty: 60 ML | Refills: 1 | Status: SHIPPED | OUTPATIENT
Start: 2023-02-22 | End: 2023-04-05

## 2023-02-22 NOTE — TELEPHONE ENCOUNTER
insulin aspart (NOVOLOG FLEXPEN) 100 UNIT/ML pen        Last Written Prescription Date:  12/26/21  Last Fill Quantity: 45mL,   # refills: 3  Last Office Visit : 05/25/22  Future Office visit:  04/05/23    Routing refill request to provider for review/approval because:  Insulin - refilled per clinic

## 2023-02-22 NOTE — TELEPHONE ENCOUNTER
Short Acting Insulin Protocol Failed 02/22/2023 09:32 AM   Protocol Details  Recent (6 mo) or future (30 days) visit within the authorizing provider's specialty        Protocol failure in error.   Previous fasting acting insulin order signed by Dr Delcid.   Pended to provider.   RTC in place with Dr Leblanc 04/05/2023. Pt prefers MD close to home, [facility].   LCV 05/25/2022 with Dr Martinez

## 2023-02-24 NOTE — TELEPHONE ENCOUNTER
Pharmacy faxes request: Lantus no longer covered. Basaglar preferred.   Olamide Marshall RN on 2/24/2023 at 2:18 PM     Order for basaglar in place.   Olamide Marshall RN on 2/24/2023 at 2:19 PM     Outpatient Medication Detail     Disp Refills Start End LARRY   insulin glargine (BASAGLAR KWIKPEN) 100 UNIT/ML pen 45 mL 0 2/8/2023  --   Sig: Inject 44 Units Subcutaneous every morning - Subcutaneous   Sent to pharmacy as: Basaglar KwikPen 100 UNIT/ML Subcutaneous Solution Pen-injector   Class: E-Prescribe   Notes to Pharmacy: If Basaglar is not covered by insurance, may substitute Lantus or Semglee or other insulin glargine product per insurance preference at same dose and frequency.     Order: [id number]   E-Prescribing Status: Receipt confirmed by pharmacy (2/8/2023  9:14 AM CST)     Printout Tracking    External Result Report     Medication Administration Instructions    Inject 44 Units Subcutaneous every morning - Subcutaneous     Pharmacy    [facility] [address] [place] [address]

## 2023-02-28 ENCOUNTER — TELEPHONE (OUTPATIENT)
Dept: ENDOCRINOLOGY | Facility: CLINIC | Age: 38
End: 2023-02-28
Payer: COMMERCIAL

## 2023-02-28 DIAGNOSIS — E10.9 TYPE 1 DIABETES MELLITUS WITHOUT COMPLICATION (H): Primary | ICD-10-CM

## 2023-03-02 NOTE — TELEPHONE ENCOUNTER
Sending order for Levemir once daily.  Ideally, patient would be on Lantus giving more equivalent duration of action to Basaglar.  We will reach out to patient over [facility] to gather more history on this allergy to Basaglar.    After looking at patient's chart a little more -- touching base over [facility] is OK -- no need to schedule at this time Irving/Vanessa.    Thanks!    Edin Herrera, PharmD  Endocrine & Diabetes [facility] Pharmacist  [address] [place] [address]  Direct Voicemail: [phone]

## 2023-03-03 ENCOUNTER — TELEPHONE (OUTPATIENT)
Dept: ENDOCRINOLOGY | Facility: CLINIC | Age: 38
End: 2023-03-03

## 2023-03-03 NOTE — TELEPHONE ENCOUNTER
PA Initiation Urgent     Medication: Lantus Pen  Insurance Company: [employer] - Phone [phone] Fax [phone]  Pharmacy Filling the Rx:    Filling Pharmacy Phone:    Filling Pharmacy Fax:    Start Date: 3/3/2023    Key:[id number]

## 2023-03-03 NOTE — TELEPHONE ENCOUNTER
Prior Authorization Approval    Authorization Effective Date: 3/3/2023  Authorization Expiration Date: 3/3/2024  Medication: Lantus Pen  Approved Dose/Quantity: 45ml/90ds  Reference #: [id number]   Insurance Company: [employer] - Phone [phone] Fax [phone]  Expected CoPay:       CoPay Card Available:      Foundation Assistance Needed:    Which Pharmacy is filling the prescription (Not needed for infusion/clinic administered):    Pharmacy Notified:    Patient Notified:

## 2023-04-05 ENCOUNTER — TELEPHONE (OUTPATIENT)
Dept: ENDOCRINOLOGY | Facility: CLINIC | Age: 38
End: 2023-04-05

## 2023-04-05 ENCOUNTER — OFFICE VISIT (OUTPATIENT)
Dept: ENDOCRINOLOGY | Facility: CLINIC | Age: 38
End: 2023-04-05
Payer: COMMERCIAL

## 2023-04-05 VITALS
WEIGHT: 224 LBS | BODY MASS INDEX: 30.95 KG/M2 | DIASTOLIC BLOOD PRESSURE: 84 MMHG | OXYGEN SATURATION: 99 % | SYSTOLIC BLOOD PRESSURE: 119 MMHG | HEART RATE: 60 BPM

## 2023-04-05 DIAGNOSIS — B36.9 FUNGAL SKIN INFECTION: ICD-10-CM

## 2023-04-05 DIAGNOSIS — E10.9 TYPE 1 DIABETES MELLITUS WITHOUT COMPLICATION (H): ICD-10-CM

## 2023-04-05 PROCEDURE — 95251 CONT GLUC MNTR ANALYSIS I&R: CPT | Performed by: INTERNAL MEDICINE

## 2023-04-05 PROCEDURE — 99215 OFFICE O/P EST HI 40 MIN: CPT | Mod: 25 | Performed by: INTERNAL MEDICINE

## 2023-04-05 RX ORDER — FLUCONAZOLE 200 MG/1
200 TABLET ORAL DAILY
Qty: 30 TABLET | Refills: 0 | Status: SHIPPED | OUTPATIENT
Start: 2023-04-05 | End: 2024-02-07

## 2023-04-05 RX ORDER — INSULIN ASPART 100 [IU]/ML
INJECTION, SOLUTION INTRAVENOUS; SUBCUTANEOUS
Qty: 30 ML | Refills: 4 | Status: SHIPPED | OUTPATIENT
Start: 2023-04-05

## 2023-04-05 NOTE — TELEPHONE ENCOUNTER
Freestyle adalberto 2 sensors denied due to plan wants patient to be on dexcom since that is preferred. Please advise on how you would like to proceed. If appealed please provide medical rational.    Dexcom will also need a pa but more than likely we will be able to get it covered since it is preferred through the patients insurance benefits.

## 2023-04-05 NOTE — PATIENT INSTRUCTIONS
How to self-adjust long-acting insulin    Check morning fasting blood sugar first thing in the morning daily  Adjust lantus dose every 3 days based on morning blood sugar:  If average is over 130, increase insulin dose by 1 units going forward  If average is 100-130, continue current dose  If average is less than 100 with no lows, decrease dose by 1 units  If you have ANY blood sugars below 70, decrease dose by 1 units immediately (do not wait a few days to change in this case)    ----------------------------------------------------------------------------------------------------------------------------------

## 2023-04-05 NOTE — TELEPHONE ENCOUNTER
PA Initiation    Medication: Freestyle adalberto 2 sensors  Insurance Company: [employer] - Phone [phone] Fax [phone]  Pharmacy Filling the Rx:    Filling Pharmacy Phone:    Filling Pharmacy Fax:    Start Date: 4/5/2023    Key: [id number]

## 2023-04-05 NOTE — LETTER
4/5/2023         RE: Faraz Horowitz  [address]  [facility] [address]        Dear Colleague,    Thank you for referring your patient, Faraz Horowitz, to the [facility] ENDOCRINOLOGY. Please see a copy of my visit note below.    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    RTC 6 months    Addendum - switch to dexcom g7 per insurance preference  Chief complaint:  Faraz is a 37 year old male seen in consultation for t1dm followup.       HISTORY OF PRESENT ILLNESS    Fabien comes to clinic for followup of t1dm.   T1dm diagnosed age 17    Current regimen:  Lantus 46  CR 1:15 over 15g carbs plus CF 1:30 starting at 140    Avg 1 unit with breakfast.  Lunch typically doesn't take.  Does bolus at dinner.        Adjusts lantus every few days by 1 unit as needed.     Does have an issue with exercise induced hypos.  Not interested in pump therapy at this time.     Having a lot of work stress currently during tax season.      Had trial of Basaglar and had wide fluctuations including periods of stacking as well so need to avoid that agent.     Due for eye exam this fall.  Sees outside ophtho.     CGM download and  interpretation (CPT 27042):        My interpretation: Generally wide variability, likely due to work stress effect.  No frequent hypos but the tend to happen a few hours after meals.  May have an issue with delayed onset of novolog action by CGM download, may be some component of patient forgetting to bolus early too which can contribute.     Endocrine relevant labs and/or imaging are as follows:  Component      Latest Ref Rng 2/23/2022 12/13/2022   Sodium      133 - 144 mmol/L 142     Potassium      3.4 - 5.3 mmol/L 4.0     Chloride      94 - 109 mmol/L 109     Carbon Dioxide      20 - 32 mmol/L 29     Anion Gap      3 - 14 mmol/L 4     Urea Nitrogen      7 - 30 mg/dL 13     Creatinine      0.66 - 1.25 mg/dL 1.01     Calcium      8.5 - 10.1 mg/dL 8.8     Glucose      70 - 99 mg/dL 49 (LL)     GFR Estimate      >60 mL/min/1.73m2 >90     Cholesterol      <200 mg/dL  199    Triglycerides      <150 mg/dL  96    HDL Cholesterol      >=40 mg/dL  65    LDL Cholesterol Calculated      <=100 mg/dL  115 (H)    Non HDL Cholesterol      <130 mg/dL  134 (H)    Creatinine Urine      mg/dL 313     Albumin Urine mg/L      mg/L 8     Albumin Urine mg/g Cr      0.00 - 17.00 mg/g Cr 2.56     TSH      0.40 - 4.00 mU/L 2.49     Hemoglobin A1C      0.0 - 5.6 % 6.3 (H)  6.6 (H)         REVIEW OF SYSTEMS    10 system ROS otherwise as per the HPI or negative    Past Medical History  Past Medical History:   Diagnosis Date     Achilles rupture, left      Acquired deflected nasal septum      Diabetes (H)      Diabetes mellitus (H)      Hypertrophy of both inferior nasal turbinates        Medications  Current Outpatient Medications   Medication Sig Dispense Refill     Continuous Blood Gluc  (FREESTYLE PEG 2 READER) GREGOR 1 each daily 1 each 0     Continuous Blood Gluc Sensor (FREESTYLE PEG 2 SENSOR) MISC 1 each every 14 days 6 each 3     fluconazole (DIFLUCAN) 200 MG tablet Take 1 tablet (200 mg) by mouth daily 30 tablet 0      insulin aspart (NOVOLOG FLEXPEN) 100 UNIT/ML pen Inject subcu 1 unit for every 15 grams of carbohydrates with meals and snacks. Cover all carbohydrates. Take mealtime insulin 10 minutes before eating. Approx 50 unit daily. 30 mL 4     insulin glargine (LANTUS PEN) 100 UNIT/ML pen Inject 44 Units Subcutaneous every morning 45 mL 4     fluticasone (FLONASE) 50 MCG/ACT nasal spray USE 2 SPRAYS INTRANASALLY ONCE A DAY FOR 7 DAYS (Patient not taking: Reported on 12/13/2022)       Glucagon, rDNA, (GLUCAGON EMERGENCY) 1 MG KIT Inject 1 mg into the muscle as needed (Severe hypoglycemia) (Patient not taking: Reported on 4/5/2023) 1 kit 3       Allergies  Allergies   Allergen Reactions     Augmentin Nausea and Vomiting         Family History  family history includes Arthritis in his paternal grandmother; Dementia in his maternal grandmother; Diabetes in his father and maternal grandmother; Hearing Loss in his maternal grandmother; Heart Disease in his father; Hypertension in his father; No Known Problems in his maternal grandfather; Prostate Cancer in his paternal grandfather.    Social History  Social History     Tobacco Use     Smoking status: Never     Smokeless tobacco: Current     Types: Chew     Tobacco comments:     Working on quitting; substituting non nicotine chew   Vaping Use     Vaping status: Never Used   Substance Use Topics     Alcohol use: Yes     Alcohol/week: 10.0 standard drinks of alcohol     Types: 10 Shots of liquor per week     Comment: 7 drinks a week     Drug use: Never       Physical Exam  /84 (BP Location: Right arm, Patient Position: Sitting, Cuff Size: Adult Large)   Pulse 60   Wt 101.6 kg (224 lb)   SpO2 99%   BMI 30.95 kg/m    Body mass index is 30.95 kg/m .    Physical Exam    GENERAL :  In no apparent distress  SKIN: Normal color, normal temperature     EYES: EOMI, No scleral icterus  NECK: No visible masses  RESP: No respiratory distress, normal effort  CARDIO: regular  rate  ABDOMEN: flat, nondistended       NEURO: awake, alert, responds appropriately to questions   EXTREMITIES: No clubbing, cyanosis or edema.  Pulses normal.  No ulcers, sores, or lesions on feet.     I spent a total of 45 minutes on the day of this established patient visit on chart review, lab review, coordinating care, discussing alternative insulin agent options to treat current issues, effect of current job conditions and how we would monitor after the tax season, exam and counseling of patient. This time was independent of time spent on download and interpretation of CGM data (CPT 18093)           Again, thank you for allowing me to participate in the care of your patient.        Sincerely,        Karl Leblanc MD

## 2023-04-05 NOTE — TELEPHONE ENCOUNTER
PRIOR AUTHORIZATION DENIED    Medication: Freestyle adalberto 2 sensors    Denial Date: 4/5/2023    Denial Rational:     Appeal Information:

## 2023-04-05 NOTE — NURSING NOTE
Chief Complaint   Patient presents with     Establish Care     New-Type 1 Diabetes Being seen by    Establish care with a different endo       Vitals:    04/05/23 0936   Pulse: 60   SpO2: 99%     Wt Readings from Last 1 Encounters:   12/13/22 103.7 kg (228 lb 9.6 oz)       Aubree Hargrove MA

## 2023-04-05 NOTE — PROGRESS NOTES
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    RTC 6 months    Addendum - switch to dexcom g7 per insurance preference  Chief complaint:  Faraz is a 37 year old male seen in consultation for t1dm followup.       HISTORY OF PRESENT ILLNESS    Fabien comes to clinic for followup of t1dm.   T1dm diagnosed age 17    Current regimen:  Lantus 46  CR 1:15 over 15g carbs plus CF 1:30 starting at 140    Avg 1 unit with breakfast.  Lunch typically doesn't take.  Does bolus at dinner.        Adjusts lantus every few days by 1 unit as needed.     Does have an issue with exercise induced hypos.  Not interested in pump therapy at this time.     Having a lot of work stress currently during tax season.      Had trial of Basaglar and had wide fluctuations including periods of stacking as well so need to avoid that agent.     Due for eye exam this fall.  Sees outside ophtho.     CGM download and interpretation (CPT 80382):        My interpretation: Generally wide variability, likely due to work stress effect.  No frequent hypos but the tend to happen a few hours after meals.  May have an issue with delayed onset of novolog action by CGM download, may be some  component of patient forgetting to bolus early too which can contribute.     Endocrine relevant labs and/or imaging are as follows:  Component      Latest Ref Rng 2/23/2022 12/13/2022   Sodium      133 - 144 mmol/L 142     Potassium      3.4 - 5.3 mmol/L 4.0     Chloride      94 - 109 mmol/L 109     Carbon Dioxide      20 - 32 mmol/L 29     Anion Gap      3 - 14 mmol/L 4     Urea Nitrogen      7 - 30 mg/dL 13     Creatinine      0.66 - 1.25 mg/dL 1.01     Calcium      8.5 - 10.1 mg/dL 8.8     Glucose      70 - 99 mg/dL 49 (LL)     GFR Estimate      >60 mL/min/1.73m2 >90     Cholesterol      <200 mg/dL  199    Triglycerides      <150 mg/dL  96    HDL Cholesterol      >=40 mg/dL  65    LDL Cholesterol Calculated      <=100 mg/dL  115 (H)    Non HDL Cholesterol      <130 mg/dL  134 (H)    Creatinine Urine      mg/dL 313     Albumin Urine mg/L      mg/L 8     Albumin Urine mg/g Cr      0.00 - 17.00 mg/g Cr 2.56     TSH      0.40 - 4.00 mU/L 2.49     Hemoglobin A1C      0.0 - 5.6 % 6.3 (H)  6.6 (H)         REVIEW OF SYSTEMS    10 system ROS otherwise as per the HPI or negative    Past Medical History  Past Medical History:   Diagnosis Date     Achilles rupture, left      Acquired deflected nasal septum      Diabetes (H)      Diabetes mellitus (H)      Hypertrophy of both inferior nasal turbinates        Medications  Current Outpatient Medications   Medication Sig Dispense Refill     Continuous Blood Gluc  (FREESTYLE PEG 2 READER) GREGOR 1 each daily 1 each 0     Continuous Blood Gluc Sensor (FREESTYLE PEG 2 SENSOR) MISC 1 each every 14 days 6 each 3     fluconazole (DIFLUCAN) 200 MG tablet Take 1 tablet (200 mg) by mouth daily 30 tablet 0     insulin aspart (NOVOLOG FLEXPEN) 100 UNIT/ML pen Inject subcu 1 unit for every 15 grams of carbohydrates with meals and snacks. Cover all carbohydrates. Take mealtime insulin 10 minutes before eating. Approx 50 unit daily. 30 mL 4     insulin glargine (LANTUS PEN) 100  UNIT/ML pen Inject 44 Units Subcutaneous every morning 45 mL 4     fluticasone (FLONASE) 50 MCG/ACT nasal spray USE 2 SPRAYS INTRANASALLY ONCE A DAY FOR 7 DAYS (Patient not taking: Reported on 12/13/2022)       Glucagon, rDNA, (GLUCAGON EMERGENCY) 1 MG KIT Inject 1 mg into the muscle as needed (Severe hypoglycemia) (Patient not taking: Reported on 4/5/2023) 1 kit 3       Allergies  Allergies   Allergen Reactions     Augmentin Nausea and Vomiting         Family History  family history includes Arthritis in his paternal grandmother; Dementia in his maternal grandmother; Diabetes in his father and maternal grandmother; Hearing Loss in his maternal grandmother; Heart Disease in his father; Hypertension in his father; No Known Problems in his maternal grandfather; Prostate Cancer in his paternal grandfather.    Social History  Social History     Tobacco Use     Smoking status: Never     Smokeless tobacco: Current     Types: Chew     Tobacco comments:     Working on quitting; substituting non nicotine chew   Vaping Use     Vaping status: Never Used   Substance Use Topics     Alcohol use: Yes     Alcohol/week: 10.0 standard drinks of alcohol     Types: 10 Shots of liquor per week     Comment: 7 drinks a week     Drug use: Never       Physical Exam  /84 (BP Location: Right arm, Patient Position: Sitting, Cuff Size: Adult Large)   Pulse 60   Wt 101.6 kg (224 lb)   SpO2 99%   BMI 30.95 kg/m    Body mass index is 30.95 kg/m .    Physical Exam    GENERAL :  In no apparent distress  SKIN: Normal color, normal temperature     EYES: EOMI, No scleral icterus  NECK: No visible masses  RESP: No respiratory distress, normal effort  CARDIO: regular rate  ABDOMEN: flat, nondistended       NEURO: awake, alert, responds appropriately to questions   EXTREMITIES: No clubbing, cyanosis or edema.  Pulses normal.  No ulcers, sores, or lesions on feet.     I spent a total of 45 minutes on the day of this established patient visit on  chart review, lab review, coordinating care, discussing alternative insulin agent options to treat current issues, effect of current job conditions and how we would monitor after the tax season, exam and counseling of patient. This time was independent of time spent on download and interpretation of CGM data (CPT 55122)

## 2023-04-12 DIAGNOSIS — E10.9 TYPE 1 DIABETES MELLITUS WITHOUT COMPLICATION (H): ICD-10-CM

## 2023-04-12 RX ORDER — ACYCLOVIR 400 MG/1
1 TABLET ORAL
Qty: 3 EACH | Refills: 5 | Status: SHIPPED | OUTPATIENT
Start: 2023-04-12 | End: 2023-05-24

## 2023-04-12 RX ORDER — PROCHLORPERAZINE 25 MG/1
SUPPOSITORY RECTAL
Qty: 1 EACH | Refills: 1 | Status: SHIPPED | OUTPATIENT
Start: 2023-04-12 | End: 2023-05-24

## 2023-04-12 RX ORDER — PROCHLORPERAZINE 25 MG/1
SUPPOSITORY RECTAL
Qty: 3 EACH | Refills: 5 | Status: SHIPPED | OUTPATIENT
Start: 2023-04-12 | End: 2023-05-24

## 2023-04-12 RX ORDER — ACYCLOVIR 400 MG/1
1 TABLET ORAL
Qty: 3 EACH | Refills: 5 | OUTPATIENT
Start: 2023-04-12 | End: 2023-04-12

## 2023-04-12 NOTE — TELEPHONE ENCOUNTER
See mychart 4/10/23. Provider given copy of mychart to review and order Dexcom 7.  Jaimee ORDONEZ RN BSN PHN  [facility]

## 2023-04-13 ENCOUNTER — TELEPHONE (OUTPATIENT)
Dept: ENDOCRINOLOGY | Facility: CLINIC | Age: 38
End: 2023-04-13

## 2023-04-13 ENCOUNTER — LAB (OUTPATIENT)
Dept: LAB | Facility: CLINIC | Age: 38
End: 2023-04-13
Payer: COMMERCIAL

## 2023-04-13 DIAGNOSIS — E10.9 TYPE 1 DIABETES MELLITUS WITHOUT COMPLICATION (H): ICD-10-CM

## 2023-04-13 LAB
ANION GAP SERPL CALCULATED.3IONS-SCNC: 10 MMOL/L (ref 7–15)
BUN SERPL-MCNC: 14.8 MG/DL (ref 6–20)
CALCIUM SERPL-MCNC: 9.5 MG/DL (ref 8.6–10)
CHLORIDE SERPL-SCNC: 104 MMOL/L (ref 98–107)
CHOLEST SERPL-MCNC: 192 MG/DL
CREAT SERPL-MCNC: 1.15 MG/DL (ref 0.67–1.17)
CREAT UR-MCNC: 369.5 MG/DL
DEPRECATED HCO3 PLAS-SCNC: 27 MMOL/L (ref 22–29)
GFR SERPL CREATININE-BSD FRML MDRD: 85 ML/MIN/1.73M2
GLUCOSE SERPL-MCNC: 142 MG/DL (ref 70–99)
HDLC SERPL-MCNC: 63 MG/DL
LDLC SERPL CALC-MCNC: 114 MG/DL
MICROALBUMIN UR-MCNC: <12 MG/L
MICROALBUMIN/CREAT UR: NORMAL MG/G{CREAT}
NONHDLC SERPL-MCNC: 129 MG/DL
POTASSIUM SERPL-SCNC: 4.7 MMOL/L (ref 3.4–5.3)
SODIUM SERPL-SCNC: 141 MMOL/L (ref 136–145)
TRIGL SERPL-MCNC: 75 MG/DL

## 2023-04-13 PROCEDURE — 80048 BASIC METABOLIC PNL TOTAL CA: CPT

## 2023-04-13 PROCEDURE — 82043 UR ALBUMIN QUANTITATIVE: CPT

## 2023-04-13 PROCEDURE — 36415 COLL VENOUS BLD VENIPUNCTURE: CPT

## 2023-04-13 PROCEDURE — 82570 ASSAY OF URINE CREATININE: CPT

## 2023-04-13 PROCEDURE — 80061 LIPID PANEL: CPT

## 2023-04-13 NOTE — TELEPHONE ENCOUNTER
PA Initiation    Medication: Dexcom G6 sensors  Insurance Company: [employer] - Phone [phone] Fax [phone]  Pharmacy Filling the Rx:    Filling Pharmacy Phone:    Filling Pharmacy Fax:    Start Date: 4/13/2023    Key:[id number]

## 2023-04-13 NOTE — TELEPHONE ENCOUNTER
Prior Authorization Approval    Authorization Effective Date: 4/13/2023  Authorization Expiration Date: 4/12/2024  Medication: Dexcom G6 sensors  Approved Dose/Quantity:3/30 days   Reference #: [id number]   Insurance Company: [employer] [phone] Fax [phone]  Expected CoPay: 30.00     CoPay Card Available:      Foundation Assistance Needed:    Which Pharmacy is filling the prescription (Not needed for infusion/clinic administered):    Pharmacy Notified:    Patient Notified:

## 2023-05-22 NOTE — TELEPHONE ENCOUNTER
Please release scripts to the pharmacy for his Dexcom supplies as it does not appear that they have been released.   Jaimee ORDONEZ RN BSN PHN  [facility]

## 2023-05-24 RX ORDER — PROCHLORPERAZINE 25 MG/1
SUPPOSITORY RECTAL
Qty: 3 EACH | Refills: 5 | Status: SHIPPED | OUTPATIENT
Start: 2023-05-24 | End: 2024-02-07 | Stop reason: ALTCHOICE

## 2023-05-24 RX ORDER — PROCHLORPERAZINE 25 MG/1
SUPPOSITORY RECTAL
Qty: 1 EACH | Refills: 1 | Status: SHIPPED | OUTPATIENT
Start: 2023-05-24 | End: 2024-02-07 | Stop reason: ALTCHOICE

## 2023-05-24 RX ORDER — ACYCLOVIR 400 MG/1
1 TABLET ORAL
Qty: 3 EACH | Refills: 5 | Status: SHIPPED | OUTPATIENT
Start: 2023-05-24 | End: 2024-02-07 | Stop reason: ALTCHOICE

## 2023-05-24 NOTE — TELEPHONE ENCOUNTER
Scripts resent to pharmacy per Cayden. Please release to pharmacy. Thank you.   \Jaimee ODRONEZ RN BSN PHN  [facility]

## 2023-06-18 ENCOUNTER — HEALTH MAINTENANCE LETTER (OUTPATIENT)
Age: 38
End: 2023-06-18

## 2023-10-05 ENCOUNTER — OFFICE VISIT (OUTPATIENT)
Dept: ENDOCRINOLOGY | Facility: CLINIC | Age: 38
End: 2023-10-05
Payer: COMMERCIAL

## 2023-10-05 VITALS
HEIGHT: 71 IN | OXYGEN SATURATION: 98 % | DIASTOLIC BLOOD PRESSURE: 86 MMHG | BODY MASS INDEX: 31.98 KG/M2 | SYSTOLIC BLOOD PRESSURE: 131 MMHG | HEART RATE: 57 BPM | WEIGHT: 228.4 LBS | RESPIRATION RATE: 16 BRPM

## 2023-10-05 DIAGNOSIS — E10.9 TYPE 1 DIABETES MELLITUS WITHOUT COMPLICATION (H): ICD-10-CM

## 2023-10-05 LAB — HBA1C MFR BLD: 6.7 % (ref 0–5.6)

## 2023-10-05 PROCEDURE — 99214 OFFICE O/P EST MOD 30 MIN: CPT | Mod: 25 | Performed by: INTERNAL MEDICINE

## 2023-10-05 PROCEDURE — 95251 CONT GLUC MNTR ANALYSIS I&R: CPT | Performed by: INTERNAL MEDICINE

## 2023-10-05 PROCEDURE — 36415 COLL VENOUS BLD VENIPUNCTURE: CPT | Performed by: INTERNAL MEDICINE

## 2023-10-05 PROCEDURE — 83036 HEMOGLOBIN GLYCOSYLATED A1C: CPT | Performed by: INTERNAL MEDICINE

## 2023-10-05 RX ORDER — ACYCLOVIR 400 MG/1
1 TABLET ORAL
Qty: 3 EACH | Refills: 5 | Status: SHIPPED | OUTPATIENT
Start: 2023-10-05 | End: 2024-02-07 | Stop reason: ALTCHOICE

## 2023-10-05 RX ORDER — BLOOD-GLUCOSE SENSOR
EACH MISCELLANEOUS
Status: CANCELLED | OUTPATIENT
Start: 2023-10-05

## 2023-10-05 RX ORDER — BLOOD-GLUCOSE SENSOR
1 EACH MISCELLANEOUS
Qty: 2 EACH | Refills: 11 | Status: SHIPPED | OUTPATIENT
Start: 2023-10-05 | End: 2024-02-07

## 2023-10-05 RX ORDER — INSULIN ASPART INJECTION 100 [IU]/ML
INJECTION, SOLUTION SUBCUTANEOUS
Qty: 30 ML | Refills: 11 | Status: SHIPPED | OUTPATIENT
Start: 2023-10-05 | End: 2024-02-07

## 2023-10-05 RX ORDER — BLOOD-GLUCOSE SENSOR
1 EACH MISCELLANEOUS
Qty: 2 EACH | Refills: 5 | Status: CANCELLED | OUTPATIENT
Start: 2023-10-05

## 2023-10-05 ASSESSMENT — PAIN SCALES - GENERAL: PAINLEVEL: NO PAIN (0)

## 2023-10-05 NOTE — LETTER
10/5/2023         RE: Faraz Horowitz  [address]  [facility] [address]        Dear Colleague,    Thank you for referring your patient, Faraz Horowitz, to the [facility] ENDOCRINOLOGY. Please see a copy of my visit note below.    Endocrine Consult note    Attending Assessment/Plan :     Type 1 diabetes mellitus without complication (H)    Assessment:  At goal a1c of <7% without frequent hypos on last check  Basal heavy but follows self-adjustment plan  Due for repeat a1c today  Benefiting from CGM  May benefit from switching prandial insulin to FIASP for potentially faster action onset    Plan:  -a1c today  -switch to FIASP at current dose as outlined in HPI  -continue lantus with self adjustment plan   -switch to dexcom g7 if covered by insurance, otherwise cash pay for adalberto 3 (sent both scripts with instructions to pharmacy today)    I have independently reviewed and interpreted labs, imaging as indicated.    RTC to see me in Feb 2024.  Will also get him set up with new provider for next summer.     Chief complaint:  Faraz is a 37 year old male seen in consultation for t1dm followup.       HISTORY OF PRESENT ILLNESS    Fabien comes to clinic for followup of t1dm.   T1dm diagnosed age 17    Current regimen:  Lantus 46    CR 1:15g carbs plus CF 1:30 starting at 140    Adjusts lantus every few days by 1 unit as needed.     Does have an issue with exercise induced hypos.  Not interested in pump therapy at this time due to abdominal scar tissue.     Will be moving to [place] to live part time during the year soon.     Had trial of Basaglar in the past and had wide fluctuations including periods of stacking as well so need to avoid that agent.     Currently using adalberto 2 cgm.  Would like to switch to dexcom g7 so he can use device on his arm but would rather stay with adalberto and cash pay vs have to use g6.     CGM download and interpretation (CPT 64444):          My interpretation: Pattern  of some early morning hypos.  More variability during the day with pattern seen last time of early post prandial hyperglycemia that then goes down into normal range so likely an insulin onset issue. TIR improved at goal range up to 65%.     Endocrine relevant labs and/or imaging are as follows:  Component      Latest Ref Rng 2/23/2022 12/13/2022   Sodium      133 - 144 mmol/L 142     Potassium      3.4 - 5.3 mmol/L 4.0     Chloride      94 - 109 mmol/L 109     Carbon Dioxide      20 - 32 mmol/L 29     Anion Gap      3 - 14 mmol/L 4     Urea Nitrogen      7 - 30 mg/dL 13     Creatinine      0.66 - 1.25 mg/dL 1.01     Calcium      8.5 - 10.1 mg/dL 8.8     Glucose      70 - 99 mg/dL 49 (LL)     GFR Estimate      >60 mL/min/1.73m2 >90     Cholesterol      <200 mg/dL  199    Triglycerides      <150 mg/dL  96    HDL Cholesterol      >=40 mg/dL  65    LDL Cholesterol Calculated      <=100 mg/dL  115 (H)    Non HDL Cholesterol      <130 mg/dL  134 (H)    Creatinine Urine      mg/dL 313     Albumin Urine mg/L      mg/L 8     Albumin Urine mg/g Cr      0.00 - 17.00 mg/g Cr 2.56     TSH      0.40 - 4.00 mU/L 2.49     Hemoglobin A1C      0.0 - 5.6 % 6.3 (H)  6.6 (H)         REVIEW OF SYSTEMS    10 system ROS otherwise as per the HPI or negative    Past Medical History  Past Medical History:   Diagnosis Date     Achilles rupture, left      Acquired deflected nasal septum      Diabetes (H)      Diabetes mellitus (H)      Hypertrophy of both inferior nasal turbinates        Medications  Current Outpatient Medications   Medication Sig Dispense Refill     Continuous Blood Gluc  (FREESTYLE PEG 2 READER) GREGOR 1 each daily 1 each 0     Continuous Blood Gluc Sensor (DEXCOM G7 SENSOR) MISC 1 Device every 10 days 3 each 5     Continuous Blood Gluc Sensor (FREESTYLE PEG 2 SENSOR) MISC 1 each every 14 days 6 each 3     Continuous Blood Gluc Sensor (FREESTYLE PEG 3 SENSOR) MISC 1 each every 14 days 2 each 11     fluconazole  (DIFLUCAN) 200 MG tablet Take 1 tablet (200 mg) by mouth daily (Patient taking differently: Take 200 mg by mouth as needed) 30 tablet 0     insulin aspart (FIASP FLEXTOUCH) 100 UNIT/ML pen-injector Use 1 unit per 15g carbs.  Max TDD 50 30 mL 11     insulin aspart (NOVOLOG FLEXPEN) 100 UNIT/ML pen Inject subcu 1 unit for every 15 grams of carbohydrates with meals and snacks. Cover all carbohydrates. Take mealtime insulin 10 minutes before eating. Approx 50 unit daily. 30 mL 4     insulin glargine (LANTUS PEN) 100 UNIT/ML pen Inject 44 Units Subcutaneous every morning 45 mL 4     insulin pen needle (31G X 5 MM) 31G X 5 MM miscellaneous Use 4 pen needles daily or as directed. 400 each 11     Continuous Blood Gluc Sensor (DEXCOM G6 SENSOR) MISC Change every 10 days. 3 each 5     Continuous Blood Gluc Sensor (DEXCOM G7 SENSOR) MISC 1 Device every 10 days 3 each 5     Continuous Blood Gluc Transmit (DEXCOM G6 TRANSMITTER) MISC Change every 3 months. 1 each 1     fluticasone (FLONASE) 50 MCG/ACT nasal spray USE 2 SPRAYS INTRANASALLY ONCE A DAY FOR 7 DAYS (Patient not taking: Reported on 12/13/2022)       Glucagon, rDNA, (GLUCAGON EMERGENCY) 1 MG KIT Inject 1 mg into the muscle as needed (Severe hypoglycemia) (Patient not taking: Reported on 4/5/2023) 1 kit 3       Allergies  Allergies   Allergen Reactions     Amoxicillin-Pot Clavulanate Nausea and Vomiting         Family History  family history includes Arthritis in his paternal grandmother; Dementia in his maternal grandmother; Diabetes in his father and maternal grandmother; Hearing Loss in his maternal grandmother; Heart Disease in his father; Hypertension in his father; No Known Problems in his maternal grandfather; Prostate Cancer in his paternal grandfather.    Social History  Social History     Tobacco Use     Smoking status: Never     Smokeless tobacco: Current     Types: Chew     Tobacco comments:     Working on quitting; substituting non nicotine chew   Vaping  "Use     Vaping Use: Never used   Substance Use Topics     Alcohol use: Yes     Alcohol/week: 10.0 standard drinks of alcohol     Types: 10 Shots of liquor per week     Comment: 7 drinks a week     Drug use: Never       Physical Exam  /86 (BP Location: Left arm, Patient Position: Sitting, Cuff Size: Adult Regular)   Pulse 57   Resp 16   Ht 1.811 m (5' 11.3\")   Wt 103.6 kg (228 lb 6.4 oz)   SpO2 98%   BMI 31.59 kg/m    Body mass index is 31.59 kg/m .    Physical Exam    GENERAL :  In no apparent distress  SKIN: Normal color, normal temperature     EYES: EOMI, No scleral icterus  NECK: No visible masses  RESP: No respiratory distress, normal effort  CARDIO: regular rate  ABDOMEN: flat, nondistended       NEURO: awake, alert, responds appropriately to questions   EXTREMITIES: No clubbing, cyanosis or edema.    I spent a total of 32 minutes on the day of this established patient visit on chart review, lab review, coordinating care, exam, counseling of patient, and This time was independent of time spent on download and interpretation of CGM data (CPT 16447)            Again, thank you for allowing me to participate in the care of your patient.        Sincerely,        Karl Leblanc MD  "

## 2023-10-05 NOTE — NURSING NOTE
"Chief Complaint   Patient presents with    Diabetes     6 month follow up       Vitals:    10/05/23 1007   BP: 131/86   BP Location: Left arm   Patient Position: Sitting   Cuff Size: Adult Regular   Pulse: 57   Resp: 16   SpO2: 98%   Weight: 103.6 kg (228 lb 6.4 oz)   Height: 1.811 m (5' 11.3\")     Wt Readings from Last 1 Encounters:   10/05/23 103.6 kg (228 lb 6.4 oz)       Sophie BANEGAS CMA.................10/5/2023    "

## 2023-10-05 NOTE — Clinical Note
Please schedule for followup with any endo provider who sees type 1 diabetes in June 2024.  Keep currently scheduled appt with Benji in feb 2024.

## 2023-10-05 NOTE — PATIENT INSTRUCTIONS
Switch to Dexcom g7 if covered by insurance, otherwise upgrade to adalberto 3.     Switch from novolog to FIASP at same doses.

## 2023-10-05 NOTE — PROGRESS NOTES
Endocrine Consult note    Attending Assessment/Plan :     Type 1 diabetes mellitus without complication (H)    Assessment:  At goal a1c of <7% without frequent hypos on last check  Basal heavy but follows self-adjustment plan  Due for repeat a1c today  Benefiting from CGM  May benefit from switching prandial insulin to FIASP for potentially faster action onset    Plan:  -a1c today  -switch to FIASP at current dose as outlined in HPI  -continue lantus with self adjustment plan   -switch to dexcom g7 if covered by insurance, otherwise cash pay for adalberto 3 (sent both scripts with instructions to pharmacy today)    I have independently reviewed and interpreted labs, imaging as indicated.    RTC to see me in Feb 2024.  Will also get him set up with new provider for next summer.     Chief complaint:  Faraz is a 37 year old male seen in consultation for t1dm followup.       HISTORY OF PRESENT ILLNESS    Fabien comes to clinic for followup of t1dm.   T1dm diagnosed age 17    Current regimen:  Lantus 46    CR 1:15g carbs plus CF 1:30 starting at 140    Adjusts lantus every few days by 1 unit as needed.     Does have an issue with exercise induced hypos.  Not interested in pump therapy at this time due to abdominal scar tissue.     Will be moving to [place] to live part time during the year soon.     Had trial of Basaglar in the past and had wide fluctuations including periods of stacking as well so need to avoid that agent.     Currently using adalberto 2 cgm.  Would like to switch to dexcom g7 so he can use device on his arm but would rather stay with adalberto and cash pay vs have to use g6.     CGM download and interpretation (CPT 86341):          My interpretation: Pattern of some early morning hypos.  More variability during the day with pattern seen last time of early post prandial hyperglycemia that then goes down into normal range so likely an insulin onset issue. TIR improved at goal range up to 65%.     Endocrine relevant  labs and/or imaging are as follows:  Component      Latest Ref Rng 2/23/2022 12/13/2022   Sodium      133 - 144 mmol/L 142     Potassium      3.4 - 5.3 mmol/L 4.0     Chloride      94 - 109 mmol/L 109     Carbon Dioxide      20 - 32 mmol/L 29     Anion Gap      3 - 14 mmol/L 4     Urea Nitrogen      7 - 30 mg/dL 13     Creatinine      0.66 - 1.25 mg/dL 1.01     Calcium      8.5 - 10.1 mg/dL 8.8     Glucose      70 - 99 mg/dL 49 (LL)     GFR Estimate      >60 mL/min/1.73m2 >90     Cholesterol      <200 mg/dL  199    Triglycerides      <150 mg/dL  96    HDL Cholesterol      >=40 mg/dL  65    LDL Cholesterol Calculated      <=100 mg/dL  115 (H)    Non HDL Cholesterol      <130 mg/dL  134 (H)    Creatinine Urine      mg/dL 313     Albumin Urine mg/L      mg/L 8     Albumin Urine mg/g Cr      0.00 - 17.00 mg/g Cr 2.56     TSH      0.40 - 4.00 mU/L 2.49     Hemoglobin A1C      0.0 - 5.6 % 6.3 (H)  6.6 (H)         REVIEW OF SYSTEMS    10 system ROS otherwise as per the HPI or negative    Past Medical History  Past Medical History:   Diagnosis Date    Achilles rupture, left     Acquired deflected nasal septum     Diabetes (H)     Diabetes mellitus (H)     Hypertrophy of both inferior nasal turbinates        Medications  Current Outpatient Medications   Medication Sig Dispense Refill    Continuous Blood Gluc  (FREESTYLE PEG 2 READER) GREGOR 1 each daily 1 each 0    Continuous Blood Gluc Sensor (DEXCOM G7 SENSOR) MISC 1 Device every 10 days 3 each 5    Continuous Blood Gluc Sensor (FREESTYLE PEG 2 SENSOR) MISC 1 each every 14 days 6 each 3    Continuous Blood Gluc Sensor (FREESTYLE PEG 3 SENSOR) MISC 1 each every 14 days 2 each 11    fluconazole (DIFLUCAN) 200 MG tablet Take 1 tablet (200 mg) by mouth daily (Patient taking differently: Take 200 mg by mouth as needed) 30 tablet 0    insulin aspart (FIASP FLEXTOUCH) 100 UNIT/ML pen-injector Use 1 unit per 15g carbs.  Max TDD 50 30 mL 11    insulin aspart (NOVOLOG  FLEXPEN) 100 UNIT/ML pen Inject subcu 1 unit for every 15 grams of carbohydrates with meals and snacks. Cover all carbohydrates. Take mealtime insulin 10 minutes before eating. Approx 50 unit daily. 30 mL 4    insulin glargine (LANTUS PEN) 100 UNIT/ML pen Inject 44 Units Subcutaneous every morning 45 mL 4    insulin pen needle (31G X 5 MM) 31G X 5 MM miscellaneous Use 4 pen needles daily or as directed. 400 each 11    Continuous Blood Gluc Sensor (DEXCOM G6 SENSOR) MISC Change every 10 days. 3 each 5    Continuous Blood Gluc Sensor (DEXCOM G7 SENSOR) MISC 1 Device every 10 days 3 each 5    Continuous Blood Gluc Transmit (DEXCOM G6 TRANSMITTER) MISC Change every 3 months. 1 each 1    fluticasone (FLONASE) 50 MCG/ACT nasal spray USE 2 SPRAYS INTRANASALLY ONCE A DAY FOR 7 DAYS (Patient not taking: Reported on 12/13/2022)      Glucagon, rDNA, (GLUCAGON EMERGENCY) 1 MG KIT Inject 1 mg into the muscle as needed (Severe hypoglycemia) (Patient not taking: Reported on 4/5/2023) 1 kit 3       Allergies  Allergies   Allergen Reactions    Amoxicillin-Pot Clavulanate Nausea and Vomiting         Family History  family history includes Arthritis in his paternal grandmother; Dementia in his maternal grandmother; Diabetes in his father and maternal grandmother; Hearing Loss in his maternal grandmother; Heart Disease in his father; Hypertension in his father; No Known Problems in his maternal grandfather; Prostate Cancer in his paternal grandfather.    Social History  Social History     Tobacco Use    Smoking status: Never    Smokeless tobacco: Current     Types: Chew    Tobacco comments:     Working on quitting; substituting non nicotine chew   Vaping Use    Vaping Use: Never used   Substance Use Topics    Alcohol use: Yes     Alcohol/week: 10.0 standard drinks of alcohol     Types: 10 Shots of liquor per week     Comment: 7 drinks a week    Drug use: Never       Physical Exam  /86 (BP Location: Left arm, Patient Position:  "Sitting, Cuff Size: Adult Regular)   Pulse 57   Resp 16   Ht 1.811 m (5' 11.3\")   Wt 103.6 kg (228 lb 6.4 oz)   SpO2 98%   BMI 31.59 kg/m    Body mass index is 31.59 kg/m .    Physical Exam    GENERAL :  In no apparent distress  SKIN: Normal color, normal temperature     EYES: EOMI, No scleral icterus  NECK: No visible masses  RESP: No respiratory distress, normal effort  CARDIO: regular rate  ABDOMEN: flat, nondistended       NEURO: awake, alert, responds appropriately to questions   EXTREMITIES: No clubbing, cyanosis or edema.    I spent a total of 32 minutes on the day of this established patient visit on chart review, lab review, coordinating care, exam, counseling of patient, and This time was independent of time spent on download and interpretation of CGM data (CPT 75664)        "

## 2023-10-11 ENCOUNTER — TELEPHONE (OUTPATIENT)
Dept: ENDOCRINOLOGY | Facility: CLINIC | Age: 38
End: 2023-10-11
Payer: COMMERCIAL

## 2023-10-11 NOTE — TELEPHONE ENCOUNTER
Patient call:     Appointment type: return diabetes    Provider: Any   Return date: June 2024   Speciality phone number: [phone]  Additional appointment(s) needed: N/A  Additional notes: Dr. Leblanc pt needs to see new a provider in June 2024    MAXIME, Laura Covarrubias on 10/11/2023 at 3:28 PM

## 2023-10-17 ENCOUNTER — TELEPHONE (OUTPATIENT)
Dept: ENDOCRINOLOGY | Facility: CLINIC | Age: 38
End: 2023-10-17
Payer: COMMERCIAL

## 2023-10-17 NOTE — TELEPHONE ENCOUNTER
Patient call:     Appointment type: return diabetes   Provider: Any   Return date:June 2024   Speciality phone number: [phone]  Additional appointment(s) needed: N/A  Additional notes: f/u with new provider dr. Leblanc pt.     Phone kept ringing couldn't leave REN, MyC x2     Sylwia Covarrubias on 10/17/2023 at 1:27 PM

## 2024-01-03 RX ORDER — INSULIN ASPART INJECTION 100 [IU]/ML
INJECTION, SOLUTION SUBCUTANEOUS
Refills: 11 | OUTPATIENT
Start: 2024-01-03

## 2024-01-14 ENCOUNTER — HEALTH MAINTENANCE LETTER (OUTPATIENT)
Age: 39
End: 2024-01-14

## 2024-02-07 ENCOUNTER — OFFICE VISIT (OUTPATIENT)
Dept: ENDOCRINOLOGY | Facility: CLINIC | Age: 39
End: 2024-02-07
Payer: COMMERCIAL

## 2024-02-07 VITALS
WEIGHT: 231 LBS | BODY MASS INDEX: 31.29 KG/M2 | SYSTOLIC BLOOD PRESSURE: 141 MMHG | DIASTOLIC BLOOD PRESSURE: 88 MMHG | RESPIRATION RATE: 16 BRPM | OXYGEN SATURATION: 97 % | HEIGHT: 72 IN | HEART RATE: 72 BPM

## 2024-02-07 DIAGNOSIS — E10.9 TYPE 1 DIABETES MELLITUS WITHOUT COMPLICATION (H): ICD-10-CM

## 2024-02-07 DIAGNOSIS — B36.9 FUNGAL SKIN INFECTION: ICD-10-CM

## 2024-02-07 LAB — HBA1C MFR BLD: 6.5 % (ref 0–5.6)

## 2024-02-07 PROCEDURE — 36415 COLL VENOUS BLD VENIPUNCTURE: CPT | Performed by: INTERNAL MEDICINE

## 2024-02-07 PROCEDURE — 83036 HEMOGLOBIN GLYCOSYLATED A1C: CPT | Performed by: INTERNAL MEDICINE

## 2024-02-07 PROCEDURE — 99213 OFFICE O/P EST LOW 20 MIN: CPT | Mod: 25 | Performed by: INTERNAL MEDICINE

## 2024-02-07 PROCEDURE — 95251 CONT GLUC MNTR ANALYSIS I&R: CPT | Performed by: INTERNAL MEDICINE

## 2024-02-07 RX ORDER — FLUCONAZOLE 200 MG/1
200 TABLET ORAL PRN
Qty: 30 TABLET | Refills: 0 | Status: SHIPPED | OUTPATIENT
Start: 2024-02-07 | End: 2024-02-29

## 2024-02-07 RX ORDER — BLOOD-GLUCOSE SENSOR
1 EACH MISCELLANEOUS
Qty: 2 EACH | Refills: 11 | Status: SHIPPED | OUTPATIENT
Start: 2024-02-07

## 2024-02-07 RX ORDER — INSULIN ASPART INJECTION 100 [IU]/ML
INJECTION, SOLUTION SUBCUTANEOUS
Qty: 30 ML | Refills: 11 | Status: SHIPPED | OUTPATIENT
Start: 2024-02-07

## 2024-02-07 ASSESSMENT — PAIN SCALES - GENERAL: PAINLEVEL: NO PAIN (0)

## 2024-02-07 NOTE — PATIENT INSTRUCTIONS
Change correction factor to 1 unit per 35.     Switch to FIASP instead of novolog if covered by insurance.

## 2024-02-07 NOTE — PROGRESS NOTES
Endocrine Consult note    Attending Assessment/Plan :        Type 1 diabetes mellitus without complication (H)  Fungal skin infection    Assessment:  At goal a1c of <7%  Basal heavy but follows self-adjustment plan  Having some hypos related to over-correction on correction dose   Benefiting from CGM  May benefit from switching prandial insulin to FIASP for potentially faster action onset    Plan:  -a1c today  -switch to FIASP at current dose as outlined in HPI  -continue lantus with self adjustment plan   -change correction factor to 1:35 to reduce post-correction hypos     I have independently reviewed and interpreted labs, imaging as indicated.    RTC 6 months     Chief complaint:  Faraz is a 38 year old male seen in consultation for t1dm followup.       HISTORY OF PRESENT ILLNESS    Fabien comes to clinic for followup of t1dm.   T1dm diagnosed age 17    Current regimen:  Lantus 46 daily     CR 1:15g carbs plus CF 1:30     Adjusts lantus every few days by 1 unit as needed.     Not interested in pump therapy at this time due to abdominal scar tissue.     Recently moved down to [place] but lives there part time, rest of the time still lives in [place].  Working on getting set up with an endo in [place].      Had trial of Basaglar in the past and had wide fluctuations including periods of stacking as well so need to avoid that agent.     Currently using adalberto 3 cgm.      Had script for fiasp but hasn't switched over yet.  Has a lot of work stress until tax season is over which affects activity levels and blood sugars.     CGM download and interpretation (CPT 05929):          My interpretation: TIR 56%.  Variable patterns with some nights hyperglycemic, others with downward trend. Does appear to be using too strong of corrective doses as he has frequent correction dosing leading to hypos.     Endocrine relevant labs and/or imaging are as follows:  Component      Latest Ref Rng 12/13/2022  8:24 AM 4/13/2023  7:45 AM  10/5/2023  10:55 AM   Cholesterol      <200 mg/dL  192     Triglycerides      <150 mg/dL  75     HDL Cholesterol      >=40 mg/dL  63     LDL Cholesterol Calculated      <=100 mg/dL  114 (H)     Non HDL Cholesterol      <130 mg/dL  129     Hemoglobin A1C      0.0 - 5.6 % 6.6 (H)   6.7 (H)       Legend:  (H) High     REVIEW OF SYSTEMS    10 system ROS otherwise as per the HPI or negative    Past Medical History  Past Medical History:   Diagnosis Date    Achilles rupture, left     Acquired deflected nasal septum     Diabetes (H)     Diabetes mellitus (H)     Hypertrophy of both inferior nasal turbinates        Medications  Current Outpatient Medications   Medication Sig Dispense Refill    Continuous Blood Gluc Sensor (FREESTYLE PEG 3 SENSOR) MISC 1 each every 14 days 2 each 11    fluconazole (DIFLUCAN) 200 MG tablet Take 1 tablet (200 mg) by mouth as needed 30 tablet 0    fluticasone (FLONASE) 50 MCG/ACT nasal spray       Glucagon, rDNA, (GLUCAGON EMERGENCY) 1 MG KIT Inject 1 mg into the muscle as needed (Severe hypoglycemia) 1 kit 3    insulin aspart (FIASP FLEXTOUCH) 100 UNIT/ML pen-injector Use 1 unit per 15g carbs.  Max TDD 50 30 mL 11    insulin aspart (NOVOLOG FLEXPEN) 100 UNIT/ML pen Inject subcu 1 unit for every 15 grams of carbohydrates with meals and snacks. Cover all carbohydrates. Take mealtime insulin 10 minutes before eating. Approx 50 unit daily. 30 mL 4    insulin glargine (LANTUS PEN) 100 UNIT/ML pen Inject 44 Units Subcutaneous every morning 45 mL 4    insulin pen needle (31G X 5 MM) 31G X 5 MM miscellaneous Use 4 pen needles daily or as directed. (Patient not taking: Reported on 2/7/2024) 400 each 11       Allergies  Allergies   Allergen Reactions    Amoxicillin-Pot Clavulanate Nausea and Vomiting         Family History  family history includes Arthritis in his paternal grandmother; Dementia in his maternal grandmother; Diabetes in his father and maternal grandmother; Edema in his paternal  "grandmother; Hearing Loss in his maternal grandmother; Heart Disease in his father; Hypertension in his father; No Known Problems in his maternal grandfather; Prostate Cancer in his paternal grandfather.    Social History  Social History     Tobacco Use    Smoking status: Never    Smokeless tobacco: Current     Types: Chew    Tobacco comments:     Working on quitting; substituting non nicotine chew   Vaping Use    Vaping Use: Never used   Substance Use Topics    Alcohol use: Yes     Alcohol/week: 10.0 standard drinks of alcohol     Types: 10 Shots of liquor per week     Comment: 7 drinks a week    Drug use: Never       Physical Exam  BP (!) 141/88 (BP Location: Right arm)   Pulse 72   Resp 16   Ht 1.835 m (6' 0.25\")   Wt 104.8 kg (231 lb)   SpO2 97%   BMI 31.11 kg/m    Body mass index is 31.11 kg/m .    Physical Exam    GENERAL :  In no apparent distress  SKIN: Normal color, normal temperature     EYES: EOMI, No scleral icterus  NECK: No visible masses  RESP: No respiratory distress, normal effort  CARDIO: regular rate  ABDOMEN: flat, nondistended       NEURO: awake, alert, responds appropriately to questions   EXTREMITIES: No clubbing, cyanosis or edema.    I spent a total of 21 minutes on the day of this established patient visit on chart review, lab review, coordinating care, exam, counseling of patient, and This time was independent of time spent on download and interpretation of CGM data (CPT 70743)        "

## 2024-02-07 NOTE — LETTER
2/7/2024         RE: Faraz Horowitz  [address] [facility] [place] [address]        Dear Colleague,    Thank you for referring your patient, Faraz Horowitz, to the [facility] ENDOCRINOLOG[facility]. Please see a copy of my visit note below.    Endocrine Consult note    Attending Assessment/Plan :        Type 1 diabetes mellitus without complication (H)  Fungal skin infection    Assessment:  At goal a1c of <7%  Basal heavy but follows self-adjustment plan  Having some hypos related to over-correction on correction dose   Benefiting from CGM  May benefit from switching prandial insulin to FIASP for potentially faster action onset    Plan:  -a1c today  -switch to FIASP at current dose as outlined in HPI  -continue lantus with self adjustment plan   -change correction factor to 1:35 to reduce post-correction hypos     I have independently reviewed and interpreted labs, imaging as indicated.    RTC 6 months     Chief complaint:  Faraz is a 38 year old male seen in consultation for t1dm followup.       HISTORY OF PRESENT ILLNESS    Fabien comes to clinic for followup of t1dm.   T1dm diagnosed age 17    Current regimen:  Lantus 46 daily     CR 1:15g carbs plus CF 1:30     Adjusts lantus every few days by 1 unit as needed.     Not interested in pump therapy at this time due to abdominal scar tissue.     Recently moved down to [place] but lives there part time, rest of the time still lives in [place].  Working on getting set up with an endo in [place].      Had trial of Basaglar in the past and had wide fluctuations including periods of stacking as well so need to avoid that agent.     Currently using adalberto 3 cgm.      Had script for fiasp but hasn't switched over yet.  Has a lot of work stress until tax season is over which affects activity levels and blood sugars.     CGM download and interpretation (CPT 52134):          My interpretation: TIR 56%.  Variable patterns with some nights hyperglycemic, others with  downward trend. Does appear to be using too strong of corrective doses as he has frequent correction dosing leading to hypos.     Endocrine relevant labs and/or imaging are as follows:  Component      Latest Ref Rng 12/13/2022  8:24 AM 4/13/2023  7:45 AM 10/5/2023  10:55 AM   Cholesterol      <200 mg/dL  192     Triglycerides      <150 mg/dL  75     HDL Cholesterol      >=40 mg/dL  63     LDL Cholesterol Calculated      <=100 mg/dL  114 (H)     Non HDL Cholesterol      <130 mg/dL  129     Hemoglobin A1C      0.0 - 5.6 % 6.6 (H)   6.7 (H)       Legend:  (H) High     REVIEW OF SYSTEMS    10 system ROS otherwise as per the HPI or negative    Past Medical History  Past Medical History:   Diagnosis Date     Achilles rupture, left      Acquired deflected nasal septum      Diabetes (H)      Diabetes mellitus (H)      Hypertrophy of both inferior nasal turbinates        Medications  Current Outpatient Medications   Medication Sig Dispense Refill     Continuous Blood Gluc Sensor (FREESTYLE PEG 3 SENSOR) MISC 1 each every 14 days 2 each 11     fluconazole (DIFLUCAN) 200 MG tablet Take 1 tablet (200 mg) by mouth as needed 30 tablet 0     fluticasone (FLONASE) 50 MCG/ACT nasal spray        Glucagon, rDNA, (GLUCAGON EMERGENCY) 1 MG KIT Inject 1 mg into the muscle as needed (Severe hypoglycemia) 1 kit 3     insulin aspart (FIASP FLEXTOUCH) 100 UNIT/ML pen-injector Use 1 unit per 15g carbs.  Max TDD 50 30 mL 11     insulin aspart (NOVOLOG FLEXPEN) 100 UNIT/ML pen Inject subcu 1 unit for every 15 grams of carbohydrates with meals and snacks. Cover all carbohydrates. Take mealtime insulin 10 minutes before eating. Approx 50 unit daily. 30 mL 4     insulin glargine (LANTUS PEN) 100 UNIT/ML pen Inject 44 Units Subcutaneous every morning 45 mL 4     insulin pen needle (31G X 5 MM) 31G X 5 MM miscellaneous Use 4 pen needles daily or as directed. (Patient not taking: Reported on 2/7/2024) 400 each 11       Allergies  Allergies  "  Allergen Reactions     Amoxicillin-Pot Clavulanate Nausea and Vomiting         Family History  family history includes Arthritis in his paternal grandmother; Dementia in his maternal grandmother; Diabetes in his father and maternal grandmother; Edema in his paternal grandmother; Hearing Loss in his maternal grandmother; Heart Disease in his father; Hypertension in his father; No Known Problems in his maternal grandfather; Prostate Cancer in his paternal grandfather.    Social History  Social History     Tobacco Use     Smoking status: Never     Smokeless tobacco: Current     Types: Chew     Tobacco comments:     Working on quitting; substituting non nicotine chew   Vaping Use     Vaping Use: Never used   Substance Use Topics     Alcohol use: Yes     Alcohol/week: 10.0 standard drinks of alcohol     Types: 10 Shots of liquor per week     Comment: 7 drinks a week     Drug use: Never       Physical Exam  BP (!) 141/88 (BP Location: Right arm)   Pulse 72   Resp 16   Ht 1.835 m (6' 0.25\")   Wt 104.8 kg (231 lb)   SpO2 97%   BMI 31.11 kg/m    Body mass index is 31.11 kg/m .    Physical Exam    GENERAL :  In no apparent distress  SKIN: Normal color, normal temperature     EYES: EOMI, No scleral icterus  NECK: No visible masses  RESP: No respiratory distress, normal effort  CARDIO: regular rate  ABDOMEN: flat, nondistended       NEURO: awake, alert, responds appropriately to questions   EXTREMITIES: No clubbing, cyanosis or edema.    I spent a total of 21 minutes on the day of this established patient visit on chart review, lab review, coordinating care, exam, counseling of patient, and This time was independent of time spent on download and interpretation of CGM data (CPT 15504)            Again, thank you for allowing me to participate in the care of your patient.        Sincerely,        Karl Leblanc MD  "

## 2024-02-07 NOTE — NURSING NOTE
"Chief Complaint   Patient presents with    Follow Up     4 month follow up  Needs next size bigger for needles 8mm?       Vitals:    02/07/24 1612 02/07/24 1614   BP: (!) 139/90 (!) 141/88   BP Location: Left arm Right arm   Patient Position: Sitting    Cuff Size: Adult Regular    Pulse: 72    Resp: 16    SpO2: 97%    Weight: 104.8 kg (231 lb)    Height: 1.835 m (6' 0.25\")      Wt Readings from Last 1 Encounters:   02/07/24 104.8 kg (231 lb)       Sophie BANEGAS CMA.................2/7/2024    "

## 2024-02-08 ENCOUNTER — TELEPHONE (OUTPATIENT)
Dept: ENDOCRINOLOGY | Facility: CLINIC | Age: 39
End: 2024-02-08
Payer: COMMERCIAL

## 2024-02-08 NOTE — TELEPHONE ENCOUNTER
Patient call:     Appointment type:New Endocrine    Provider: Amandeep   Return date: 9/24/24   Speciality phone number: [phone]  Additional appointment(s) needed: N/A   Additional notes: Spoke to pt and scheduled per below   Karl Leblanc MD  [id number]  Please schedule for followup with any endo provider who sees diabetes in 6 months.    Sylwia Covarrubias on 2/8/2024 at 10:56 AM

## 2024-02-12 ENCOUNTER — TELEPHONE (OUTPATIENT)
Dept: ENDOCRINOLOGY | Facility: CLINIC | Age: 39
End: 2024-02-12
Payer: COMMERCIAL

## 2024-02-12 NOTE — TELEPHONE ENCOUNTER
Clarification  Medication- fluconazole (DIFLUCAN) 200 MG tablet    Pharmacy comments:  Missing/ illegible information on RX- SIG code- missing  Frequency, is it every day or PRN?    [id number] - [place], [place] - [facility] [address] [facility] [address] [facility] [address]   Phone: [phone] Fax: [phone]   Hours: Not open 24 hours     Thank you,  Mariposa JACKSON [facility]

## 2024-02-29 DIAGNOSIS — B36.9 FUNGAL SKIN INFECTION: ICD-10-CM

## 2024-02-29 DIAGNOSIS — E10.9 TYPE 1 DIABETES MELLITUS WITHOUT COMPLICATION (H): ICD-10-CM

## 2024-02-29 RX ORDER — FLUCONAZOLE 200 MG/1
200 TABLET ORAL PRN
Qty: 30 TABLET | Refills: 0 | Status: SHIPPED | OUTPATIENT
Start: 2024-02-29

## 2024-08-11 ENCOUNTER — HEALTH MAINTENANCE LETTER (OUTPATIENT)
Age: 39
End: 2024-08-11

## 2024-11-17 DIAGNOSIS — E10.9 TYPE 1 DIABETES MELLITUS WITHOUT COMPLICATION (H): ICD-10-CM

## 2024-11-20 NOTE — TELEPHONE ENCOUNTER
"Last Endo visit with Dr. Leblanc on 2/7/24.  Per note:    \"Recently moved down to [place] but lives there part time, rest of the time still lives in [place].  Working on getting set up with an endo in [place].       Had trial of Basaglar in the past and had wide fluctuations including periods of stacking as well so need to avoid that agent.      Currently using adalberto 3 cgm.       Had script for fiasp but hasn't switched over yet.  Has a lot of work stress until tax season is over which affects activity levels and blood sugars.\"    RTC in 6 months advised. Patient had an appointment with Dr. Bernstein on 9/24/24 but this was canceled by the clinic. No future Endo appointment currently scheduled.    Sent patient a [employer] message asking if he established care with an Endo provider in [place]?     Ember Maurice RN on 11/20/2024 at 11:15 AM      "

## 2025-01-26 ENCOUNTER — HEALTH MAINTENANCE LETTER (OUTPATIENT)
Age: 40
End: 2025-01-26

## 2025-02-23 ENCOUNTER — HEALTH MAINTENANCE LETTER (OUTPATIENT)
Age: 40
End: 2025-02-23